# Patient Record
Sex: MALE | Race: WHITE | NOT HISPANIC OR LATINO | Employment: OTHER | URBAN - METROPOLITAN AREA
[De-identification: names, ages, dates, MRNs, and addresses within clinical notes are randomized per-mention and may not be internally consistent; named-entity substitution may affect disease eponyms.]

---

## 2017-02-10 ENCOUNTER — APPOINTMENT (OUTPATIENT)
Dept: LAB | Facility: CLINIC | Age: 77
End: 2017-02-10
Payer: MEDICARE

## 2017-02-10 ENCOUNTER — TRANSCRIBE ORDERS (OUTPATIENT)
Dept: LAB | Facility: CLINIC | Age: 77
End: 2017-02-10

## 2017-02-10 DIAGNOSIS — E78.5 HYPERLIPIDEMIA: ICD-10-CM

## 2017-02-10 LAB
ALT SERPL W P-5'-P-CCNC: 28 U/L (ref 12–78)
AST SERPL W P-5'-P-CCNC: 20 U/L (ref 5–45)
CHOLEST SERPL-MCNC: 239 MG/DL (ref 50–200)
HDLC SERPL-MCNC: 86 MG/DL (ref 40–60)
LDLC SERPL CALC-MCNC: 130 MG/DL (ref 0–100)
TRIGL SERPL-MCNC: 115 MG/DL

## 2017-02-10 PROCEDURE — 36415 COLL VENOUS BLD VENIPUNCTURE: CPT

## 2017-02-10 PROCEDURE — 84450 TRANSFERASE (AST) (SGOT): CPT

## 2017-02-10 PROCEDURE — 84460 ALANINE AMINO (ALT) (SGPT): CPT

## 2017-02-10 PROCEDURE — 80061 LIPID PANEL: CPT

## 2017-02-21 ENCOUNTER — ALLSCRIPTS OFFICE VISIT (OUTPATIENT)
Dept: OTHER | Facility: OTHER | Age: 77
End: 2017-02-21

## 2017-03-07 ENCOUNTER — GENERIC CONVERSION - ENCOUNTER (OUTPATIENT)
Dept: OTHER | Facility: OTHER | Age: 77
End: 2017-03-07

## 2017-04-04 ENCOUNTER — ALLSCRIPTS OFFICE VISIT (OUTPATIENT)
Dept: OTHER | Facility: OTHER | Age: 77
End: 2017-04-04

## 2017-04-04 DIAGNOSIS — E87.1 HYPO-OSMOLALITY AND HYPONATREMIA: ICD-10-CM

## 2017-04-18 ENCOUNTER — TRANSCRIBE ORDERS (OUTPATIENT)
Dept: LAB | Facility: CLINIC | Age: 77
End: 2017-04-18

## 2017-04-18 ENCOUNTER — APPOINTMENT (OUTPATIENT)
Dept: LAB | Facility: CLINIC | Age: 77
End: 2017-04-18
Payer: MEDICARE

## 2017-04-18 ENCOUNTER — ALLSCRIPTS OFFICE VISIT (OUTPATIENT)
Dept: OTHER | Facility: OTHER | Age: 77
End: 2017-04-18

## 2017-04-18 DIAGNOSIS — E87.1 HYPO-OSMOLALITY AND HYPONATREMIA: ICD-10-CM

## 2017-04-18 LAB
ANION GAP SERPL CALCULATED.3IONS-SCNC: 9 MMOL/L (ref 4–13)
BUN SERPL-MCNC: 18 MG/DL (ref 5–25)
CALCIUM SERPL-MCNC: 9.7 MG/DL (ref 8.3–10.1)
CHLORIDE SERPL-SCNC: 97 MMOL/L (ref 100–108)
CO2 SERPL-SCNC: 29 MMOL/L (ref 21–32)
CREAT SERPL-MCNC: 1.14 MG/DL (ref 0.6–1.3)
GFR SERPL CREATININE-BSD FRML MDRD: >60 ML/MIN/1.73SQ M
GLUCOSE P FAST SERPL-MCNC: 90 MG/DL (ref 65–99)
POTASSIUM SERPL-SCNC: 3.9 MMOL/L (ref 3.5–5.3)
SODIUM SERPL-SCNC: 135 MMOL/L (ref 136–145)

## 2017-04-18 PROCEDURE — 80048 BASIC METABOLIC PNL TOTAL CA: CPT

## 2017-04-18 PROCEDURE — 36415 COLL VENOUS BLD VENIPUNCTURE: CPT

## 2017-05-09 ENCOUNTER — GENERIC CONVERSION - ENCOUNTER (OUTPATIENT)
Dept: OTHER | Facility: OTHER | Age: 77
End: 2017-05-09

## 2017-06-21 ENCOUNTER — ALLSCRIPTS OFFICE VISIT (OUTPATIENT)
Dept: OTHER | Facility: OTHER | Age: 77
End: 2017-06-21

## 2017-10-03 ENCOUNTER — ALLSCRIPTS OFFICE VISIT (OUTPATIENT)
Dept: OTHER | Facility: OTHER | Age: 77
End: 2017-10-03

## 2017-10-04 NOTE — PROGRESS NOTES
Assessment  1  Atherosclerotic heart disease of native coronary artery without angina pectoris (414 01)   (I25 10)   2  Hyperlipidemia (272 4) (E78 5)   3  Hypertension (401 9) (I10)   4  Peripheral vascular disease (443 9) (I73 9)   5  GERD without esophagitis (530 81) (K21 9)   6  Osteoarthrosis (715 90) (M19 90)    Plan  Atherosclerotic heart disease of native coronary artery without angina pectoris, Benign  familial tremor, Carotid atherosclerosis, unspecified laterality, GERD without  esophagitis, Hyperlipidemia, Hypertension, Hyponatremia, Osteoarthrosis, Peripheral  vascular disease    · (1) CBC/PLT/DIFF; Status:Active; Requested NJK:69ZBY4824;    · (1) COMPREHENSIVE METABOLIC PANEL; Status:Active; Requested LPP:14CFS1003;    · (1) LIPID PANEL, FASTING; Status:Active; Requested RADHA:00LXD1176;    · (1) T4, FREE; Status:Active; Requested WQV:44LRF9809;    · (1) TSH; Status:Active; Requested ANUPAMA:21FFS2601; Health Maintenance    · Fluzone High-Dose 0 5 ML Intramuscular Suspension Prefilled Syringe    Discussion/Summary    Patricia Marina is a pleasant 75-year-old white male with arteriosclerotic disease who presents today to follow-up on his chronic medical illnesses  ASCVD -blood pressure and lipids are stable  Follow up with Cardiology as indicated  Hypertension -his blood pressure is improved  Continue amlodipine, atenolol, enalapril and HCTZ  Hyperlipidemia -stable  Continue atorvastatin  Peripheral vascular -continue aspirin  Follow up with vascular surgery  GERD -he remains on omeprazole every other day  Osteoarthritis -he is trying to avoid meloxicam  Tylenol as needed  and follow-up 4 months  vaccine today  Chief Complaint  pt is here for follow up of hyperlipidemia, hypertension and to review blood work   cd      History of Present Illness  Patient presents to review his chronic medical conditions  - trying not to use it because it affects his BP  takes about one tylenol per day  continues to suffer from claudication and has pain with walking less than a block  He sees Vascular annually  Review of Systems    Constitutional: as noted in HPI  Eyes: No complaints of eye pain, no red eyes, no discharge from eyes, no itchy eyes  ENT: no complaints of earache, no hearing loss, no nosebleeds, no nasal discharge, no sore throat, no hoarseness  Cardiovascular: as noted in HPI  Respiratory: No complaints of shortness of breath, no wheezing, no cough, no SOB on exertion, no orthopnea or PND  Gastrointestinal: No complaints of abdominal pain, no constipation, no nausea or vomiting, no diarrhea or bloody stools  Genitourinary: No complaints of dysuria, no incontinence, no hesitancy, no nocturia, no genital lesion, no testicular pain  Musculoskeletal: No complaints of arthralgia, no myalgias, no joint swelling or stiffness, no limb pain or swelling  Integumentary: No complaints of skin rash or skin lesions, no itching, no skin wound, no dry skin  Neurological: No compliants of headache, no confusion, no convulsions, no numbness or tingling, no dizziness or fainting, no limb weakness, no difficulty walking  Psychiatric: Is not suicidal, no sleep disturbances, no anxiety or depression, no change in personality, no emotional problems  Endocrine: No complaints of proptosis, no hot flashes, no muscle weakness, no erectile dysfunction, no deepening of the voice, no feelings of weakness  Hematologic/Lymphatic: No complaints of swollen glands, no swollen glands in the neck, does not bleed easily, no easy bruising  Active Problems  1  Angina pectoris (413 9) (I20 9)   2  Atherosclerotic heart disease of native coronary artery without angina pectoris (414 01)   (I25 10)   3  Benign familial tremor (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 1) (G25 0)   4  Carotid atherosclerosis, unspecified laterality (433 10) (I65 29)   5  GERD without esophagitis (530 81) (K21 9)   6  Hyperlipidemia (272 4) (E78 5)   7  Hypertension (401 9) (I10)   8  Hyponatremia (276 1) (E87 1)   9  Osteoarthrosis (715 90) (M19 90)   10  Peripheral vascular disease (443 9) (I73 9)    Past Medical History  1  History of Visit for eye and vision exam (V72 0) (Z01 00)    Surgical History  1  History of CABG    The surgical history was reviewed and updated today  Family History  Mother    1  Family history of Hypertension (V17 49)  Son    2  Family history of Atherosclerosis  Family History    3  No family history of mental disorder    The family history was reviewed and updated today  Social History   · Being A Social Drinker   · Former smoker (A71 11) (F70 467)   · No secondhand smoke exposure (V49 89) (Z78 9)   · Retired   ·   The social history was reviewed and updated today  The social history was reviewed and is unchanged  Current Meds   1  AmLODIPine Besylate 10 MG Oral Tablet; take 1 tablet by mouth once daily; Therapy: 95HBE8333 to (Evaluate:83Mnk7605)  Requested for: 28XQC5310; Last   Rx:08Bci9417 Ordered   2  Aspirin 81 MG TABS; 2 tabs daily; Therapy: (Recorded:10Oct2016) to Recorded   3  Atenolol 25 MG Oral Tablet; take 1/2 tablet by mouth once daily; Therapy: 79XXQ1904 to (Latricia Sousa)  Requested for: 76LLI9294; Last   IQ:21QJL2497 Ordered   4  Atorvastatin Calcium 10 MG Oral Tablet; TAKE 1 TABLET DAILY AS DIRECTED; Therapy: 75OEQ4194 to ([phone])  Requested for: 36JPZ9592; Last   Rx:10Oct2016 Ordered   5  Enalapril Maleate 20 MG Oral Tablet; take 1 tablet by mouth twice a day as directed; Therapy: 95XHD8760 to (Evaluate:28Jun2018)  Requested for: 04NQL6620; Last   Rx:23Hxm7310 Ordered   6  HydroCHLOROthiazide 25 MG Oral Tablet; TAKE 1/2 TABLET BY MOUTH DAILY; Therapy: 21INK8765 to (Allayne Boast)  Requested for: 50RKH5450 Recorded   7  Magnesium Citrate Powder; Therapy: 45HEM2223 to Recorded   8  Meloxicam 15 MG Oral Tablet; take 1 tablet by mouth once daily as directed;    Therapy: 72BOY8458 to (FOGKYYNF:17FJE1064)  Requested for: 06Cgz4609; Last   Rx:77Jxl9034 Ordered   9  Nitrostat 0 4 MG Sublingual Tablet Sublingual;   Therapy: 94BLG0356 to (Last Rx:96Stp8966)  Requested for: 43RIB0625 Ordered   10  Omeprazole 20 MG Oral Capsule Delayed Release; take 1 capsule by mouth once daily; Therapy: 16DAY3355 to (Evaluate:67Eka8843)  Requested for: 42Ixa3197; Last    Rx:74Pxf9105 Ordered    The medication list was reviewed and updated today  Allergies  1  Doxycycline Hyclate TABS    Vitals  Vital Signs    Recorded: 39MEI1761 11:11AM   Heart Rate 64, L Radial   Pulse Quality Regular, L Radial   Systolic 744, LUE, Sitting   Diastolic 78, LUE, Sitting   Height 5 ft 2 in   Weight 146 lb    BMI Calculated 26 7   BSA Calculated 1 67     Physical Exam    Constitutional   General appearance: No acute distress, well appearing and well nourished  Eyes   Conjunctiva and lids: No swelling, erythema, or discharge  Pupils and irises: Equal, round and reactive to light  Pulmonary   Respiratory effort: No increased work of breathing or signs of respiratory distress  Auscultation of lungs: Clear to auscultation, equal breath sounds bilaterally, no wheezes, no rales, no rhonci  Cardiovascular   Auscultation of heart: Normal rate and rhythm, normal S1 and S2, without murmurs  Examination of extremities for edema and/or varicosities: Normal     Carotid pulses: Normal     Lymphatic   Palpation of lymph nodes in neck: No lymphadenopathy  Musculoskeletal   Gait and station: Normal     Digits and nails: Normal without clubbing or cyanosis  Psychiatric   Orientation to person, place and time: Normal     Mood and affect: Normal          Results/Data  PHQ-2 Adult Depression Screening 03TIZ9791 11:40AM User, [facility]     Test Name Result Flag Reference   PHQ-2 Adult Depression Score 1     Over the last two weeks, how often have you been bothered by any of the following problems?   Little interest or pleasure in doing things: Several days - 1  Feeling down, depressed, or hopeless: Not at all - 0   PHQ-2 Adult Depression Screening Negative       PHQ-9 Adult Depression Screening 12DOO6958 11:40AM User, [facility]     Test Name Result Flag Reference   PHQ-9 Adult Depression Score 1     Over the last two weeks, how often have you been bothered by any of the following problems? Little interest or pleasure in doing things: Several days - 1  Feeling down, depressed, or hopeless: Not at all - 0  Trouble falling or staying asleep, or sleeping too much: Not at all - 0  Feeling tired or having little energy: Not at all - 0  Poor appetite or over eating: Not at all - 0  Feeling bad about yourself - or that you are a failure or have let yourself or your family down: Not at all - 0  Trouble concentrating on things, such as reading the newspaper or watching television: Not at all - 0  Moving or speaking so slowly that other people could have noticed  Or the opposite -  being so fidgety or restless that you have been moving around a lot more than usual: Not at all - 0  Thoughts that you would be better off dead, or of hurting yourself in some way: Not at all - 0   PHQ-9 Adult Depression Screening Negative     PHQ-9 Difficulty Level Not difficult at all     PHQ-9 Severity Minimal Depression       Falls Risk Assessment (Dx Z13 89 Screen for Neurologic Disorder) 03Oct2017 11:39AM User, [employer]     Test Name Result Flag Reference   Falls Risk      No falls in the past year       Future Appointments    Date/Time Provider Specialty Site   02/06/2018 11:30 JOSE Graves   [facility] [address]     Signatures   Electronically signed by : JOSE Gutierrez ; Oct  3 2017  1:04PM EST                       (Author)

## 2018-01-02 DIAGNOSIS — I73.9 PERIPHERAL VASCULAR DISEASE (HCC): ICD-10-CM

## 2018-01-02 DIAGNOSIS — M19.90 OSTEOARTHRITIS: ICD-10-CM

## 2018-01-02 DIAGNOSIS — I65.29 OCCLUSION AND STENOSIS OF UNSPECIFIED CAROTID ARTERY: ICD-10-CM

## 2018-01-02 DIAGNOSIS — E78.5 HYPERLIPIDEMIA: ICD-10-CM

## 2018-01-02 DIAGNOSIS — E87.1 HYPO-OSMOLALITY AND HYPONATREMIA (CODE): ICD-10-CM

## 2018-01-02 DIAGNOSIS — K21.9 GASTRO-ESOPHAGEAL REFLUX DISEASE WITHOUT ESOPHAGITIS: ICD-10-CM

## 2018-01-02 DIAGNOSIS — I25.10 ATHEROSCLEROTIC HEART DISEASE OF NATIVE CORONARY ARTERY WITHOUT ANGINA PECTORIS: ICD-10-CM

## 2018-01-02 DIAGNOSIS — I10 ESSENTIAL (PRIMARY) HYPERTENSION: ICD-10-CM

## 2018-01-02 DIAGNOSIS — G25.0 ESSENTIAL TREMOR: ICD-10-CM

## 2018-01-11 NOTE — RESULT NOTES
Message   Recorded as Task   Date: 10/13/2016 09:32 AM, Created By: Israel Patel   Task Name: Follow Up   Assigned To: Lucia Gunn   Regarding Patient: Jim Larios, Status: Active   CommentMerrilee Bucy - 13 Oct 2016 9:32 AM     TASK CREATED  Please notify Mr Gasca that his sodium is stable (it is 132) and it will be repeated in 2-3 months with the labwork I ordered at his appt  Thank you,  Светлана Em - 13 Oct 2016 2:14 PM     TASK EDITED                 [facility] notifying  pt of sodium level results       Signatures   Electronically signed by : Genesis Razo, ; Oct 13 2016  2:14PM EST                       (Author)

## 2018-01-13 VITALS
HEART RATE: 64 BPM | DIASTOLIC BLOOD PRESSURE: 70 MMHG | WEIGHT: 151.25 LBS | SYSTOLIC BLOOD PRESSURE: 122 MMHG | BODY MASS INDEX: 27.83 KG/M2 | HEIGHT: 62 IN

## 2018-01-13 VITALS
DIASTOLIC BLOOD PRESSURE: 60 MMHG | HEART RATE: 64 BPM | HEIGHT: 62 IN | SYSTOLIC BLOOD PRESSURE: 154 MMHG | WEIGHT: 150.8 LBS | BODY MASS INDEX: 27.75 KG/M2

## 2018-01-13 VITALS
HEIGHT: 62 IN | HEART RATE: 64 BPM | DIASTOLIC BLOOD PRESSURE: 78 MMHG | SYSTOLIC BLOOD PRESSURE: 142 MMHG | WEIGHT: 146 LBS | BODY MASS INDEX: 26.87 KG/M2

## 2018-01-14 VITALS
WEIGHT: 145 LBS | HEART RATE: 60 BPM | BODY MASS INDEX: 26.52 KG/M2 | DIASTOLIC BLOOD PRESSURE: 80 MMHG | SYSTOLIC BLOOD PRESSURE: 150 MMHG

## 2018-01-15 NOTE — MISCELLANEOUS
Message  Spoke to Mr Verna Menendez today and reviewed his latest BPs ranging from 130-170 / 73-96  He would prefer to return to his prior regimen of Atenolol, Amlodipine, HCTZ and Enalapril  Will do so, and d/c Benicar HCT  He will continue to monitor at home  He finds that his readings are lower when he does not check them as much        Signatures   Electronically signed by : Jacklyn Simmonds, M D ; May  9 2017  2:45PM EST                       (Author)

## 2018-01-15 NOTE — PROGRESS NOTES
Assessment    1  Medicare annual wellness visit, subsequent (V70 0) (Z00 00)   2  Atherosclerotic heart disease of native coronary artery without angina pectoris (414 01)   (I25 10)   3  Hyperlipidemia (272 4) (E78 5)   4  Hypertension (401 9) (I10)   5  Peripheral vascular disease (443 9) (I73 9)   6  GERD without esophagitis (530 81) (K21 9)   7  Osteoarthrosis (715 90) (M19 90)    Plan  Atherosclerotic heart disease of native coronary artery without angina pectoris, Benign  familial tremor, Carotid atherosclerosis, unspecified laterality, GERD without  esophagitis, Hyperlipidemia, Hypertension, Hyponatremia, Osteoarthrosis, Peripheral  vascular disease    · (1) CBC/PLT/DIFF; Status:Active; Requested WUJ:84SRG7178;    · (1) COMPREHENSIVE METABOLIC PANEL; Status:Active; Requested OLVERA:41LNC8183;    · (1) LIPID PANEL, FASTING; Status:Active; Requested BLACK:45JHH9401;    · (1) T4, FREE; Status:Active; Requested XWK:61JMZ1286;    · (1) TSH; Status:Active; Requested NTW:51RGR4400; Health Maintenance    · Fluzone High-Dose 0 5 ML Intramuscular Suspension Prefilled Syringe    Discussion/Summary    Patient presents for his subsequent annual Medicare questionnaire  His living will and advanced directives are complete  Immunizations are up-to-date  His depression screen is negative  For full evaluation of his chronic medical conditions, please see today's note  Impression: Subsequent Annual Wellness Visit  Cardiovascular screening and counseling: the risks and benefits of screening were discussed and screening is current  Diabetes screening and counseling: the risks and benefits of screening were discussed and screening is current  Prostate cancer screening and counseling: the patient declines screening and screening not indicated  Abdominal aortic aneurysm screening and counseling: the risks and benefits of screening were discussed  Glaucoma screening and counseling: screening is current     HIV screening and counseling: screening not indicated  Immunizations: influenza vaccine is up to date this year, the lifetime pneumococcal vaccine has been completed and Zostavax vaccination up to date  Advance Directive Planning: complete and up to date  Chief Complaint  pt is here for subsequent medicare wellness visit   cd      History of Present Illness  The patient is being seen for the subsequent annual wellness visit  Medicare Screening and Risk Factors   Hospitalizations: no previous hospitalizations  Once per lifetime medicare screening tests: ECG has not been done  Medicare Screening Tests Risk Questions   Abdominal aortic aneurysm risk assessment: over 72years of age  Osteoporosis risk assessment: , over 48years of age, tobacco use and alcohol use  HIV risk assessment: none indicated  Drug and Alcohol Use: The patient is a former cigarette smoker  The patient reports frequent alcohol use and drinking 2 drinks per day  Alcohol concern:   The patient has no concerns about alcohol abuse  He has never used illicit drugs  Diet and Physical Activity: Current diet includes well balanced meals, 1 servings of fruit per day, 2 servings of vegetables per day, 2 servings of meat per day, 1 servings of whole grains per day, 2 servings of simple carbohydrates per day, 1 servings of dairy products per day, 5 cups of coffee per day, 1 cans of diet soda per day and 3 glasses of water daily  He exercises 5 times per week  Exercise: walking 30 minutes per day  Mood Disorder and Cognitive Impairment Screening: PHQ-9 Depression Scale   Depression screening score was 0     negative for symptoms  He reports feeling down, depressed, or hopeless over the past two weeks  He denies feeling little interest or pleasure in doing things over the past two weeks     Cognitive impairment screening: denies difficulty learning/retaining new information, denies difficulty handling complex tasks, denies difficulty with reasoning, denies difficulty with spatial ability and orientation, denies difficulty with language and denies difficulty with behavior  Functional Ability/Level of Safety: Hearing is normal bilaterally, normal in the right ear and normal in the left ear  He denies hearing difficulties  He does not use a hearing aid  The patient is currently able to do activities of daily living without limitations, able to do instrumental activities of daily living without limitations, able to participate in social activities without limitations and able to drive without limitations  Activities of daily living details: does not need help using the phone, no transportation help needed, does not need help shopping, no meal preparation help needed, does not need help doing housework, does not need help doing laundry, does not need help managing medications and does not need help managing money  Fall risk factors: The patient fell 0 times in the past 12 months  Home safety risk factors:  no unfamiliar surroundings, no loose rugs, no poor household lighting, no uneven floors, no household clutter, grab bars in the bathroom and handrails on the stairs  Advance Directives: Advance directives: living will, durable power of  for health care directives and advance directives  end of life decisions were reviewed with the patient and I agree with the patient's decisions  Co-Managers and Medical Equipment/Suppliers: See Patient Care Team   Falls Risk: The patient fell 0 times in the past 12 months  The patient currently has no urinary incontinence symptoms  Active Problems    1  Angina pectoris (413 9) (I20 9)   2  Atherosclerotic heart disease of native coronary artery without angina pectoris (414 01)   (I25 10)   3  Benign familial tremor (333 1) (G25 0)   4  Carotid atherosclerosis, unspecified laterality (433 10) (I65 29)   5  GERD without esophagitis (530 81) (K21 9)   6  Hyperlipidemia (272 4) (E78 5)   7  Hypertension (401 9) (I10)   8  Hyponatremia (276 1) (E87 1)   9  Osteoarthrosis (715 90) (M19 90)   10  Peripheral vascular disease (443 9) (I73 9)    Past Medical History    1  History of Visit for eye and vision exam (V72 0) (Z01 00)    Surgical History    1  History of CABG    Family History  Mother    1  Family history of Hypertension (V17 49)  Son    2  Family history of Atherosclerosis  Family History    3  No family history of mental disorder    Social History    · Being A Social Drinker   · Former smoker (C20 80) (I52 209)   · No secondhand smoke exposure (V49 89) (Z78 9)   · Retired   ·     Current Meds   1  AmLODIPine Besylate 10 MG Oral Tablet; take 1 tablet by mouth once daily; Therapy: 04SRP5856 to (Evaluate:02Vwm5377)  Requested for: 72KKU7119; Last   Rx:93Mak5359 Ordered   2  Aspirin 81 MG TABS; 2 tabs daily; Therapy: (Recorded:10Oct2016) to Recorded   3  Atenolol 25 MG Oral Tablet; take 1/2 tablet by mouth once daily; Therapy: 39URJ6248 to (Wang Cerda)  Requested for: 68DHL2452; Last   VM:63DHI8462 Ordered   4  Atorvastatin Calcium 10 MG Oral Tablet; TAKE 1 TABLET DAILY AS DIRECTED; Therapy: 46LLY0982 to ([phone])  Requested for: 22KYS7100; Last   Rx:10Oct2016 Ordered   5  Enalapril Maleate 20 MG Oral Tablet; take 1 tablet by mouth twice a day as directed; Therapy: 50WYU9975 to (Evaluate:28Jun2018)  Requested for: 96GYF3459; Last   Rx:53Ahf9294 Ordered   6  HydroCHLOROthiazide 25 MG Oral Tablet; TAKE 1/2 TABLET BY MOUTH DAILY; Therapy: 01HAL3056 to (Kirk Coronado)  Requested for: 39EBW3203 Recorded   7  Magnesium Citrate Powder; Therapy: 29OAF3797 to Recorded   8  Meloxicam 15 MG Oral Tablet; take 1 tablet by mouth once daily as directed; Therapy: 07HIE5290 to (72 240 26 09)  Requested for: 12Aec0164; Last   Rx:26Jko6166 Ordered   9   Nitrostat 0 4 MG Sublingual Tablet Sublingual;   Therapy: 62UOY8977 to (Last Rx:08Odq9506)  Requested for: 75THF3296 Ordered   10  Omeprazole 20 MG Oral Capsule Delayed Release; take 1 capsule by mouth once daily; Therapy: 39QCF9429 to (Evaluate:27Tpc1909)  Requested for: 34Xka7708; Last    Rx:82Jbz0599 Ordered    Allergies    1  Doxycycline Hyclate TABS    Immunizations  Influenza --- Charlynne Loupe: 21-Oct-2012  (72y); Series2: 23-Aug-2013  (73y); Series3: 13-Oct-2015   (75y); Series4: 10-Oct-2016  (76y); Series5: [id number]   PPSV --- Charlynne Loupe: [id number]   Zoster --- Charlynne Loupe: [id number]     Vitals  Signs   Recorded: 03EYV0799 11:11AM   Heart Rate: 64, L Radial  Pulse Quality: Regular, L Radial  Systolic: 854, LUE, Sitting  Diastolic: 78, LUE, Sitting  Height: 5 ft 2 in  Weight: 146 lb   BMI Calculated: 26 7  BSA Calculated: 1 67    Results/Data  PHQ-2 Adult Depression Screening 93KAG4066 11:40AM User, [employer]     Test Name Result Flag Reference   PHQ-2 Adult Depression Score 1     Over the last two weeks, how often have you been bothered by any of the following problems? Little interest or pleasure in doing things: Several days - 1  Feeling down, depressed, or hopeless: Not at all - 0   PHQ-2 Adult Depression Screening Negative       PHQ-9 Adult Depression Screening 12RSO7200 11:40AM User, [employer]     Test Name Result Flag Reference   PHQ-9 Adult Depression Score 1     Over the last two weeks, how often have you been bothered by any of the following problems? Little interest or pleasure in doing things: Several days - 1  Feeling down, depressed, or hopeless: Not at all - 0  Trouble falling or staying asleep, or sleeping too much: Not at all - 0  Feeling tired or having little energy: Not at all - 0  Poor appetite or over eating: Not at all - 0  Feeling bad about yourself - or that you are a failure or have let yourself or your family down: Not at all - 0  Trouble concentrating on things, such as reading the newspaper or watching television: Not at all - 0  Moving or speaking so slowly that other people could have noticed   Or the opposite -  being so fidgety or restless that you have been moving around a lot more than usual: Not at all - 0  Thoughts that you would be better off dead, or of hurting yourself in some way: Not at all - 0   PHQ-9 Adult Depression Screening Negative     PHQ-9 Difficulty Level Not difficult at all     PHQ-9 Severity Minimal Depression       Falls Risk Assessment (Dx Z13 89 Screen for Neurologic Disorder) 03Oct2017 11:39AM User, [facility]     Test Name Result Flag Reference   Falls Risk      No falls in the past year       Future Appointments    Date/Time Provider Specialty Site   02/06/2018 11:30 Marvis Bence, M D   [facility] [address]     Signatures   Electronically signed by : JOSE Sharp ; Oct  3 2017  1:08PM EST                       (Author)

## 2018-01-17 NOTE — PROGRESS NOTES
Chief Complaint  pt was in for a b/p check as ordered by dr Tim Clarke   pts b/p was taken on left arm with a reading of 160/78 and right arm with a reading of 140/64   i brought this to dr Charmaine Peralta and her recommendation was for the patient to continue checking b/p at home and to call if it goes over 150 consistently for 3 days   this was relayed to the patient and he agreed   cd      Active Problems    1  Angina pectoris (413 9) (I20 9)   2  Atherosclerotic heart disease of native coronary artery without angina pectoris (414 01)   (I25 10)   3  Benign familial tremor (333 1) (G25 0)   4  Carotid atherosclerosis, unspecified laterality (433 10) (I65 29)   5  Eustachian tube dysfunction (381 81) (H69 80)   6  GERD without esophagitis (530 81) (K21 9)   7  Hyperlipidemia (272 4) (E78 5)   8  Hypertension (401 9) (I10)   9  Hyponatremia (276 1) (E87 1)   10  Osteoarthritis (715 90) (M19 90)   11  Peripheral vascular disease (443 9) (I73 9)    Current Meds   1  AmLODIPine Besylate 10 MG Oral Tablet; take 1 tablet by mouth once daily; Therapy: 84DYG8587 to (Evaluate:68Pvk0555)  Requested for: 47VWL6761; Last   Rx:91Pqb7573 Ordered   2  Aspirin 81 MG TABS; 2 tabs daily; Therapy: (Recorded:10Oct2016) to Recorded   3  Atenolol 25 MG Oral Tablet; take 1/2 tablet by mouth once daily; Therapy: 33SMA3313 to (Evaluate:71Ukg2008)  Requested for: 67NWV3106; Last   Rx:35Npj9875 Ordered   4  Atorvastatin Calcium 10 MG Oral Tablet; TAKE 1 TABLET DAILY AS DIRECTED; Therapy: 22CGS8585 to (Didi Benavides)  Requested for: 12MPW4496; Last   Rx:10Oct2016 Ordered   5  Benicar HCT 40-25 MG Oral Tablet; TAKE 1 TABLET DAILY; Therapy: 17HDD5829 to Recorded   6  Magnesium Citrate Powder; Therapy: 87UGX5790 to Recorded   7  Meloxicam 15 MG Oral Tablet; take 1 tablet by mouth once daily as directed; Therapy: 81GKQ2864 to (Evaluate:87Ztc1721)  Requested for: 42Ise7425; Last   Rx:88Krq7212 Ordered   8   Nitrostat 0 4 MG Sublingual Tablet Sublingual;   Therapy: 98EOV6004 to (Last Rx:22Ohu8326)  Requested for: 22BAH1940 Ordered   9  Omeprazole 20 MG Oral Capsule Delayed Release; take 1 capsule by mouth once daily; Therapy: 24JRJ2895 to (Evaluate:99Uud2889)  Requested for: 82Sfz0962; Last   Rx:41Dnw1235 Ordered    Allergies    1  Doxycycline Hyclate TABS    Vitals  Signs    Heart Rate: 64  Height: 5 ft 2 in  Weight: 151 lb 6 4 oz  BMI Calculated: 27 69  BSA Calculated: 1 7    Future Appointments    Date/Time Provider Specialty Site   06/21/2017 09:15 JOSE Segura   [address] [place] [address]     Signatures   Electronically signed by : Sonal Rodriguez, ; Apr 18 2017  2:25PM EST                       (Author)    Electronically signed by : JOSE Holcomb ; Apr 18 2017  9:50PM EST                       (Author)

## 2018-01-18 ENCOUNTER — TRANSCRIBE ORDERS (OUTPATIENT)
Dept: LAB | Facility: CLINIC | Age: 78
End: 2018-01-18

## 2018-01-18 ENCOUNTER — APPOINTMENT (OUTPATIENT)
Dept: LAB | Facility: CLINIC | Age: 78
End: 2018-01-18
Payer: MEDICARE

## 2018-01-18 DIAGNOSIS — E78.5 HYPERLIPIDEMIA: ICD-10-CM

## 2018-01-18 DIAGNOSIS — I65.29 OCCLUSION AND STENOSIS OF UNSPECIFIED CAROTID ARTERY: ICD-10-CM

## 2018-01-18 DIAGNOSIS — I10 ESSENTIAL (PRIMARY) HYPERTENSION: ICD-10-CM

## 2018-01-18 DIAGNOSIS — M19.90 OSTEOARTHRITIS: ICD-10-CM

## 2018-01-18 DIAGNOSIS — I25.10 ATHEROSCLEROTIC HEART DISEASE OF NATIVE CORONARY ARTERY WITHOUT ANGINA PECTORIS: ICD-10-CM

## 2018-01-18 DIAGNOSIS — K21.9 GASTRO-ESOPHAGEAL REFLUX DISEASE WITHOUT ESOPHAGITIS: ICD-10-CM

## 2018-01-18 DIAGNOSIS — I73.9 PERIPHERAL VASCULAR DISEASE (HCC): ICD-10-CM

## 2018-01-18 DIAGNOSIS — E87.1 HYPO-OSMOLALITY AND HYPONATREMIA (CODE): ICD-10-CM

## 2018-01-18 DIAGNOSIS — G25.0 ESSENTIAL TREMOR: ICD-10-CM

## 2018-01-18 LAB
ALBUMIN SERPL BCP-MCNC: 3.9 G/DL (ref 3.5–5)
ALP SERPL-CCNC: 77 U/L (ref 46–116)
ALT SERPL W P-5'-P-CCNC: 22 U/L (ref 12–78)
ANION GAP SERPL CALCULATED.3IONS-SCNC: 6 MMOL/L (ref 4–13)
AST SERPL W P-5'-P-CCNC: 22 U/L (ref 5–45)
BASOPHILS # BLD AUTO: 0.07 THOUSANDS/ΜL (ref 0–0.1)
BASOPHILS NFR BLD AUTO: 1 % (ref 0–1)
BILIRUB SERPL-MCNC: 0.69 MG/DL (ref 0.2–1)
BUN SERPL-MCNC: 18 MG/DL (ref 5–25)
CALCIUM SERPL-MCNC: 9 MG/DL (ref 8.3–10.1)
CHLORIDE SERPL-SCNC: 96 MMOL/L (ref 100–108)
CHOLEST SERPL-MCNC: 222 MG/DL (ref 50–200)
CO2 SERPL-SCNC: 29 MMOL/L (ref 21–32)
CREAT SERPL-MCNC: 1.17 MG/DL (ref 0.6–1.3)
EOSINOPHIL # BLD AUTO: 0.26 THOUSAND/ΜL (ref 0–0.61)
EOSINOPHIL NFR BLD AUTO: 4 % (ref 0–6)
ERYTHROCYTE [DISTWIDTH] IN BLOOD BY AUTOMATED COUNT: 13.5 % (ref 11.6–15.1)
GFR SERPL CREATININE-BSD FRML MDRD: 59 ML/MIN/1.73SQ M
GLUCOSE P FAST SERPL-MCNC: 90 MG/DL (ref 65–99)
HCT VFR BLD AUTO: 38.3 % (ref 36.5–49.3)
HDLC SERPL-MCNC: 77 MG/DL (ref 40–60)
HGB BLD-MCNC: 13.4 G/DL (ref 12–17)
LDLC SERPL CALC-MCNC: 119 MG/DL (ref 0–100)
LYMPHOCYTES # BLD AUTO: 2.67 THOUSANDS/ΜL (ref 0.6–4.47)
LYMPHOCYTES NFR BLD AUTO: 42 % (ref 14–44)
MCH RBC QN AUTO: 29.1 PG (ref 26.8–34.3)
MCHC RBC AUTO-ENTMCNC: 35 G/DL (ref 31.4–37.4)
MCV RBC AUTO: 83 FL (ref 82–98)
MONOCYTES # BLD AUTO: 0.62 THOUSAND/ΜL (ref 0.17–1.22)
MONOCYTES NFR BLD AUTO: 10 % (ref 4–12)
NEUTROPHILS # BLD AUTO: 2.71 THOUSANDS/ΜL (ref 1.85–7.62)
NEUTS SEG NFR BLD AUTO: 43 % (ref 43–75)
NRBC BLD AUTO-RTO: 0 /100 WBCS
PLATELET # BLD AUTO: 329 THOUSANDS/UL (ref 149–390)
PMV BLD AUTO: 9.4 FL (ref 8.9–12.7)
POTASSIUM SERPL-SCNC: 4 MMOL/L (ref 3.5–5.3)
PROT SERPL-MCNC: 7.4 G/DL (ref 6.4–8.2)
RBC # BLD AUTO: 4.6 MILLION/UL (ref 3.88–5.62)
SODIUM SERPL-SCNC: 131 MMOL/L (ref 136–145)
T4 FREE SERPL-MCNC: 1.08 NG/DL (ref 0.76–1.46)
TRIGL SERPL-MCNC: 128 MG/DL
TSH SERPL DL<=0.05 MIU/L-ACNC: 1.85 UIU/ML (ref 0.36–3.74)
WBC # BLD AUTO: 6.34 THOUSAND/UL (ref 4.31–10.16)

## 2018-01-18 PROCEDURE — 84439 ASSAY OF FREE THYROXINE: CPT

## 2018-01-18 PROCEDURE — 84443 ASSAY THYROID STIM HORMONE: CPT

## 2018-01-18 PROCEDURE — 80061 LIPID PANEL: CPT

## 2018-01-18 PROCEDURE — 36415 COLL VENOUS BLD VENIPUNCTURE: CPT

## 2018-01-18 PROCEDURE — 85025 COMPLETE CBC W/AUTO DIFF WBC: CPT

## 2018-01-18 PROCEDURE — 80053 COMPREHEN METABOLIC PANEL: CPT

## 2018-01-22 VITALS — BODY MASS INDEX: 27.86 KG/M2 | HEART RATE: 64 BPM | HEIGHT: 62 IN | WEIGHT: 151.4 LBS

## 2018-02-03 RX ORDER — HYDROCHLOROTHIAZIDE 25 MG/1
0.5 TABLET ORAL DAILY
COMMUNITY
Start: 2011-06-09 | End: 2018-02-04 | Stop reason: SDUPTHER

## 2018-02-03 RX ORDER — NITROGLYCERIN 0.4 MG/1
TABLET SUBLINGUAL
COMMUNITY
Start: 2011-07-14

## 2018-02-03 RX ORDER — ASPIRIN 81 MG/1
2 TABLET ORAL DAILY
COMMUNITY
End: 2018-10-23 | Stop reason: CLARIF

## 2018-02-03 RX ORDER — MAGNESIUM CITRATE
POWDER (GRAM) MISCELLANEOUS
COMMUNITY
Start: 2015-03-20 | End: 2018-10-03

## 2018-02-03 RX ORDER — MELOXICAM 15 MG/1
1 TABLET ORAL DAILY
COMMUNITY
Start: 2010-09-16 | End: 2018-02-13 | Stop reason: SDUPTHER

## 2018-02-03 RX ORDER — ATENOLOL 25 MG/1
0.5 TABLET ORAL DAILY
COMMUNITY
Start: 2010-09-16 | End: 2018-05-06 | Stop reason: SDUPTHER

## 2018-02-03 RX ORDER — ENALAPRIL MALEATE 20 MG/1
1 TABLET ORAL 2 TIMES DAILY
COMMUNITY
Start: 2012-01-03 | End: 2018-07-08 | Stop reason: SDUPTHER

## 2018-02-03 RX ORDER — OMEPRAZOLE 20 MG/1
1 CAPSULE, DELAYED RELEASE ORAL DAILY
COMMUNITY
Start: 2013-10-05 | End: 2018-02-15 | Stop reason: SDUPTHER

## 2018-02-03 RX ORDER — AMLODIPINE BESYLATE 10 MG/1
1 TABLET ORAL DAILY
COMMUNITY
Start: 2011-09-10 | End: 2018-07-15 | Stop reason: SDUPTHER

## 2018-02-03 RX ORDER — ATORVASTATIN CALCIUM 10 MG/1
1 TABLET, FILM COATED ORAL DAILY
COMMUNITY
Start: 2016-10-10 | End: 2018-10-21 | Stop reason: SDUPTHER

## 2018-02-04 DIAGNOSIS — I10 ESSENTIAL HYPERTENSION: Primary | ICD-10-CM

## 2018-02-04 RX ORDER — HYDROCHLOROTHIAZIDE 25 MG/1
TABLET ORAL
Qty: 90 TABLET | Refills: 1 | Status: SHIPPED | OUTPATIENT
Start: 2018-02-04 | End: 2019-01-27 | Stop reason: SDUPTHER

## 2018-02-06 ENCOUNTER — OFFICE VISIT (OUTPATIENT)
Dept: FAMILY MEDICINE CLINIC | Facility: CLINIC | Age: 78
End: 2018-02-06
Payer: MEDICARE

## 2018-02-06 VITALS
HEIGHT: 62 IN | HEART RATE: 64 BPM | SYSTOLIC BLOOD PRESSURE: 140 MMHG | DIASTOLIC BLOOD PRESSURE: 62 MMHG | BODY MASS INDEX: 27.42 KG/M2 | WEIGHT: 149 LBS

## 2018-02-06 DIAGNOSIS — I77.9 BILATERAL CAROTID ARTERY DISEASE (HCC): ICD-10-CM

## 2018-02-06 DIAGNOSIS — I73.9 PERIPHERAL VASCULAR DISEASE (HCC): ICD-10-CM

## 2018-02-06 DIAGNOSIS — E78.00 PURE HYPERCHOLESTEROLEMIA: Primary | ICD-10-CM

## 2018-02-06 DIAGNOSIS — E87.1 HYPONATREMIA: ICD-10-CM

## 2018-02-06 DIAGNOSIS — G25.0 BENIGN FAMILIAL TREMOR: ICD-10-CM

## 2018-02-06 DIAGNOSIS — K21.9 GERD WITHOUT ESOPHAGITIS: ICD-10-CM

## 2018-02-06 DIAGNOSIS — M19.90 OSTEOARTHRITIS, UNSPECIFIED OSTEOARTHRITIS TYPE, UNSPECIFIED SITE: ICD-10-CM

## 2018-02-06 PROCEDURE — 99214 OFFICE O/P EST MOD 30 MIN: CPT | Performed by: FAMILY MEDICINE

## 2018-02-06 RX ORDER — ROSUVASTATIN CALCIUM 10 MG/1
5 TABLET, COATED ORAL
COMMUNITY
End: 2018-02-06 | Stop reason: ALTCHOICE

## 2018-02-06 NOTE — PROGRESS NOTES
Assessment/Plan:    Mr Sonya Minaya is a pleasant 57-year-old, ex , who presents today to follow-up on his chronic medical conditions and to review blood work  He has a long history of vascular disease and is followed by vascular surgeons at [facility]  He does not want to make any changes to his current medication regimen  At the current time, he is feeling well and has no complaints  1   Hyperlipidemia -he is on Lipitor 10 mg daily and does not want to increase his dosage  LDL is 119    2   Peripheral vascular disease -follow up with vascular  Continue excellent control of blood pressure and lipids  Continue aspirin 81 mg daily  3   Bilateral carotid artery disease -follow up with vascular  Continue excellent control of blood pressure and lipids  Continue aspirin 81 mg daily  4   Familial tremor -no intervention at this time  5   GERD -remain controlled with the use of omeprazole  symptoms  6  Osteoarthritis -  he uses meloxicam as needed  7   Hyponatremia -sodium is 131 and stable  Labs reviewed at length with patient  Follow-up in 4 months  Subjective:      Patient ID: Jero Graham is a 76 y o  male  Chief complaint:  Pt is here for follow up of hypertension and hyperlipidemia and to review blood work   cd        The following portions of the patient's history were reviewed and updated as appropriate: allergies, current medications, past family history, past medical history, past social history, past surgical history and problem list     Review of Systems   Constitutional:        See HPI   HENT: Negative for congestion, ear pain, mouth sores, sinus pressure and trouble swallowing  Eyes: Negative for discharge, redness and itching  Respiratory: Negative for apnea, cough, chest tightness, shortness of breath, wheezing and stridor  Cardiovascular: Negative for chest pain, palpitations and leg swelling          He does have lower extremity claudication after walking 7/10th of a mile  This is chronic  Gastrointestinal: Negative for abdominal distention, abdominal pain, blood in stool, constipation, diarrhea, nausea and vomiting  Endocrine: Negative for cold intolerance and heat intolerance  Genitourinary: Negative for difficulty urinating, dysuria, flank pain and urgency  Musculoskeletal: Negative for arthralgias and myalgias  Skin: Negative for rash  Neurological: Negative for dizziness, seizures, syncope, speech difficulty, weakness, light-headedness, numbness and headaches  Hematological: Negative for adenopathy  Psychiatric/Behavioral: Negative for agitation, behavioral problems, confusion and sleep disturbance  The patient is not nervous/anxious  Objective:  /80 (BP Location: Left arm, Patient Position: Sitting, Cuff Size: Standard)   Pulse 64   Ht 5' 2" (1 575 m)   Wt 67 6 kg (149 lb)   BMI 27 25 kg/m²       Physical Exam   Constitutional: He is oriented to person, place, and time  He appears well-developed and well-nourished  No distress  HENT:   Head: Normocephalic and atraumatic  Right Ear: External ear normal    Left Ear: External ear normal    Eyes: Conjunctivae and EOM are normal  Pupils are equal, round, and reactive to light  No scleral icterus  Neck: Normal range of motion  Neck supple  Right carotid murmur noted  Cardiovascular: Normal rate, regular rhythm, normal heart sounds and intact distal pulses  Exam reveals no gallop and no friction rub  No murmur heard  Pulmonary/Chest: Effort normal  No respiratory distress  He has no wheezes  He has no rales  He exhibits no tenderness  Musculoskeletal: Normal range of motion  He exhibits no edema, tenderness or deformity  Lymphadenopathy:     He has no cervical adenopathy  Neurological: He is alert and oriented to person, place, and time  He has normal reflexes  Skin: Skin is warm and dry  He is not diaphoretic  Psychiatric: He has a normal mood and affect   His behavior is normal  Judgment and thought content normal

## 2018-02-13 DIAGNOSIS — M19.90 OSTEOARTHRITIS, UNSPECIFIED OSTEOARTHRITIS TYPE, UNSPECIFIED SITE: Primary | ICD-10-CM

## 2018-02-13 RX ORDER — MELOXICAM 15 MG/1
TABLET ORAL
Qty: 30 TABLET | Refills: 3 | Status: SHIPPED | OUTPATIENT
Start: 2018-02-13 | End: 2018-06-26 | Stop reason: SDUPTHER

## 2018-02-15 DIAGNOSIS — K21.9 GASTROESOPHAGEAL REFLUX DISEASE, ESOPHAGITIS PRESENCE NOT SPECIFIED: Primary | ICD-10-CM

## 2018-02-15 RX ORDER — OMEPRAZOLE 20 MG/1
20 CAPSULE, DELAYED RELEASE ORAL DAILY
Qty: 90 CAPSULE | Refills: 0 | Status: SHIPPED | OUTPATIENT
Start: 2018-02-15 | End: 2018-07-29 | Stop reason: SDUPTHER

## 2018-05-06 DIAGNOSIS — I10 ESSENTIAL HYPERTENSION: Primary | ICD-10-CM

## 2018-05-06 RX ORDER — ATENOLOL 25 MG/1
TABLET ORAL
Qty: 45 TABLET | Refills: 3 | Status: SHIPPED | OUTPATIENT
Start: 2018-05-06 | End: 2019-04-28 | Stop reason: SDUPTHER

## 2018-06-12 ENCOUNTER — OFFICE VISIT (OUTPATIENT)
Dept: FAMILY MEDICINE CLINIC | Facility: CLINIC | Age: 78
End: 2018-06-12
Payer: MEDICARE

## 2018-06-12 VITALS
DIASTOLIC BLOOD PRESSURE: 90 MMHG | SYSTOLIC BLOOD PRESSURE: 150 MMHG | BODY MASS INDEX: 26.76 KG/M2 | HEART RATE: 60 BPM | HEIGHT: 62 IN | WEIGHT: 145.4 LBS

## 2018-06-12 DIAGNOSIS — I73.9 PERIPHERAL VASCULAR DISEASE (HCC): Primary | ICD-10-CM

## 2018-06-12 DIAGNOSIS — E78.00 PURE HYPERCHOLESTEROLEMIA: ICD-10-CM

## 2018-06-12 DIAGNOSIS — I77.9 BILATERAL CAROTID ARTERY DISEASE (HCC): ICD-10-CM

## 2018-06-12 DIAGNOSIS — K21.9 GERD WITHOUT ESOPHAGITIS: ICD-10-CM

## 2018-06-12 DIAGNOSIS — E87.1 HYPONATREMIA: ICD-10-CM

## 2018-06-12 PROCEDURE — 99214 OFFICE O/P EST MOD 30 MIN: CPT | Performed by: FAMILY MEDICINE

## 2018-06-12 NOTE — PROGRESS NOTES
Assessment/Plan:  Mr Javier Rouse is a pleasant 10-year-old white male who presents today to follow-up on his chronic medical conditions, most of which relate to arteriosclerotic disease  At the present time, he is feeling stable with no additional acute complaints  1   Peripheral vascular disease -he recently saw vascular surgery  No change in current regimen  Continue aspirin 81 mg daily with emphasis on lipid and blood pressure control  He was on Plavix in the past but it was stopped due to intolerance  2   Bilateral carotid artery disease -see above  His carotid status is stable  3   GERD - omeprazole every other day  4  Hyperlipidemia -stable  Continue atorvastatin 10 mg daily  5   Hyponatremia -follow-up BMP  6   HTN -blood pressure is elevated 150/70  He does not want to use additional medication and states that he has white coat syndrome  Continue atenolol 25 mg, enalapril 20 mg, HCTZ and amlodipine 10 mg daily and follow-up in 3 months  Monitor BP at home  7  OA -he continues to use meloxicam for daily pain, although he is aware that this may increase his blood pressure  Tylenol does not help him  Follow-up 3-4 months  Subjective: pt is here for follow up of chronic conditions~cd     Patient ID: Latrell Marie is a [de-identified] y o  male  Patient presents today to follow-up on chronic medical conditions he recently saw vascular surgery without any changes in current regimen  He feels that he is stable and does not want any additional meds        The following portions of the patient's history were reviewed and updated as appropriate: allergies, current medications, past family history, past medical history, past social history, past surgical history and problem list     Review of Systems   Constitutional:        See HPI   HENT: Negative for congestion, ear pain, mouth sores, sinus pressure and trouble swallowing  Eyes: Negative for discharge, redness and itching     Respiratory: Negative for apnea, cough, chest tightness, shortness of breath, wheezing and stridor  Cardiovascular: Negative for chest pain, palpitations and leg swelling  Gastrointestinal: Negative for abdominal distention, abdominal pain, blood in stool, constipation, diarrhea, nausea and vomiting  Endocrine: Negative for cold intolerance and heat intolerance  Genitourinary: Negative for difficulty urinating, dysuria, flank pain and urgency  Musculoskeletal: Negative for arthralgias and myalgias  Skin: Negative for rash  Neurological: Negative for dizziness, seizures, syncope, speech difficulty, weakness, light-headedness, numbness and headaches  Hematological: Negative for adenopathy  Psychiatric/Behavioral: Negative for agitation, behavioral problems, confusion and sleep disturbance  The patient is not nervous/anxious  Objective:  Vitals:    06/12/18 1124   BP: 150/90   BP Location: Left arm   Patient Position: Sitting   Cuff Size: Standard   Pulse: 60   Weight: 66 kg (145 lb 6 4 oz)   Height: 5' 2" (1 575 m)                Physical Exam   Constitutional: He is oriented to person, place, and time  He appears well-developed and well-nourished  No distress  HENT:   Head: Normocephalic and atraumatic  Right Ear: External ear normal    Left Ear: External ear normal    Eyes: Conjunctivae and EOM are normal  Pupils are equal, round, and reactive to light  No scleral icterus  Neck: Normal range of motion  Neck supple  Cardiovascular: Normal rate, regular rhythm, normal heart sounds and intact distal pulses  Exam reveals no gallop and no friction rub  No murmur heard  Pulmonary/Chest: Effort normal  No respiratory distress  He has no wheezes  He has no rales  He exhibits no tenderness  Musculoskeletal: Normal range of motion  He exhibits no edema, tenderness or deformity  Lymphadenopathy:     He has no cervical adenopathy  Neurological: He is alert and oriented to person, place, and time   He has normal reflexes  Skin: Skin is warm and dry  He is not diaphoretic  Psychiatric: He has a normal mood and affect   His behavior is normal  Judgment and thought content normal

## 2018-06-26 DIAGNOSIS — M19.90 OSTEOARTHRITIS, UNSPECIFIED OSTEOARTHRITIS TYPE, UNSPECIFIED SITE: ICD-10-CM

## 2018-06-26 RX ORDER — MELOXICAM 15 MG/1
TABLET ORAL
Qty: 30 TABLET | Refills: 3 | Status: SHIPPED | OUTPATIENT
Start: 2018-06-26 | End: 2018-10-28 | Stop reason: SDUPTHER

## 2018-07-08 DIAGNOSIS — I10 BENIGN ESSENTIAL HYPERTENSION: Primary | ICD-10-CM

## 2018-07-09 RX ORDER — ENALAPRIL MALEATE 20 MG/1
TABLET ORAL
Qty: 180 TABLET | Refills: 3 | Status: SHIPPED | OUTPATIENT
Start: 2018-07-09 | End: 2018-11-19

## 2018-07-15 DIAGNOSIS — I10 ESSENTIAL HYPERTENSION: Primary | ICD-10-CM

## 2018-07-16 RX ORDER — AMLODIPINE BESYLATE 10 MG/1
TABLET ORAL
Qty: 90 TABLET | Refills: 3 | Status: SHIPPED | OUTPATIENT
Start: 2018-07-16 | End: 2019-07-14 | Stop reason: SDUPTHER

## 2018-07-29 DIAGNOSIS — K21.9 GASTROESOPHAGEAL REFLUX DISEASE, ESOPHAGITIS PRESENCE NOT SPECIFIED: ICD-10-CM

## 2018-07-30 RX ORDER — OMEPRAZOLE 20 MG/1
CAPSULE, DELAYED RELEASE ORAL
Qty: 90 CAPSULE | Refills: 0 | Status: SHIPPED | OUTPATIENT
Start: 2018-07-30 | End: 2019-02-03 | Stop reason: SDUPTHER

## 2018-09-27 ENCOUNTER — TELEPHONE (OUTPATIENT)
Dept: FAMILY MEDICINE CLINIC | Facility: CLINIC | Age: 78
End: 2018-09-27

## 2018-09-27 NOTE — TELEPHONE ENCOUNTER
PT STATES THAT HE SEES DR Riddhi George AND THAT HE NEEDS HER ADVICE - HE HAD AN "INCIDENT" ABOUT A WEEK AGO WHICH LEFT HIM WITH COGNITIVE AND EYESIGHT PROBLEMS  I OFFERED TO GIVE HIM AN APPOINTMENT TODAY WITH ANOTHER DOCTOR AND PATIENT REFUSED, STATING THAT HE WANTS ADVICE FROM DR Riddhi George  PLEASE ADVISE  THANK YOU  LIKE I SAID, I TRIED GETTING HIM IN TODAY WITH ANY OF OUR PROVIDERS AND HE WANTS TO TALK TO DR Riddhi George AND SEE WHAT SHE HAS TO SAY

## 2018-09-27 NOTE — TELEPHONE ENCOUNTER
I spoke with patient at length today after receiving a message about his "incident"  Apparently, one week ago he coughed and then felt that he lost some cognitive function  He describes this as not being able to remember how to get into the car, or the garage, how to use the sweeper, or how to work the [employer]  He did not want to go to the ER, and so went for his daily walk  He notes that he had no loss of motor or sensory function  He did admit to visual disturbance, but again, did not seek medical intervention  He lives alone, but has spoken to his daughter, who is a nurse in [place]  He is feeling better, but not back to normal   He refuses to go to the ER for further evaluation  He has a long hx of PAD and PVD, and certainly has risk factors for CVA, which he is well aware of  I suggested a stat MRI of brain, but he refuses at this time  He does agree to be seen in the office tomorrow  He will see Dr NUNN  He remains on daily aspirin  Will not increase dose until a bleed is ruled out  Pt voices understanding of the above, and also of the risks of future CVA

## 2018-09-28 ENCOUNTER — OFFICE VISIT (OUTPATIENT)
Dept: FAMILY MEDICINE CLINIC | Facility: CLINIC | Age: 78
End: 2018-09-28
Payer: MEDICARE

## 2018-09-28 VITALS
HEART RATE: 68 BPM | BODY MASS INDEX: 26.74 KG/M2 | WEIGHT: 146.2 LBS | SYSTOLIC BLOOD PRESSURE: 142 MMHG | DIASTOLIC BLOOD PRESSURE: 80 MMHG

## 2018-09-28 DIAGNOSIS — E87.1 HYPONATREMIA: ICD-10-CM

## 2018-09-28 DIAGNOSIS — I63.9 CEREBROVASCULAR ACCIDENT (CVA), UNSPECIFIED MECHANISM (HCC): Primary | ICD-10-CM

## 2018-09-28 DIAGNOSIS — I70.219 ATHEROSCLEROSIS OF NATIVE ARTERY OF LOWER EXTREMITY WITH INTERMITTENT CLAUDICATION, UNSPECIFIED LATERALITY (HCC): ICD-10-CM

## 2018-09-28 DIAGNOSIS — I49.9 CARDIAC ARRHYTHMIA, UNSPECIFIED CARDIAC ARRHYTHMIA TYPE: ICD-10-CM

## 2018-09-28 DIAGNOSIS — E78.00 PURE HYPERCHOLESTEROLEMIA: ICD-10-CM

## 2018-09-28 DIAGNOSIS — I77.9 BILATERAL CAROTID ARTERY DISEASE (HCC): ICD-10-CM

## 2018-09-28 PROCEDURE — 93000 ELECTROCARDIOGRAM COMPLETE: CPT | Performed by: FAMILY MEDICINE

## 2018-09-28 PROCEDURE — 99215 OFFICE O/P EST HI 40 MIN: CPT | Performed by: FAMILY MEDICINE

## 2018-09-28 NOTE — ASSESSMENT & PLAN NOTE
Based on the symptoms that the patient reported, he does have a stroke  He has a persistent neurologic deficit more than 72 hr   I would recommend MRI stat to rule out any problems  We can further follow-up after that  Of note, if he cannot get the MRI stat, it would be more than reasonable to wait till Monday or Tuesday

## 2018-09-28 NOTE — ASSESSMENT & PLAN NOTE
EKG today did not show any arrhythmia, but on exam he did have some dropped beats  There was a slight intraventricular conduction delay, and in the future we could consider a Holter monitor  We do need to make sure that we check blood work, and follow up after that

## 2018-09-28 NOTE — ASSESSMENT & PLAN NOTE
Patient does have some bruits bilaterally  These are essentially unchanged from before  He has follow-up with vascular scheduled at some point in the near future

## 2018-09-28 NOTE — PROGRESS NOTES
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)  Stable no current symptoms  Bilateral carotid artery disease ([facility] )  Patient does have some bruits bilaterally  These are essentially unchanged from before  He has follow-up with vascular scheduled at some point in the near future  CVA (cerebral vascular accident) [facility])  Based on the symptoms that the patient reported, he does have a stroke  He has a persistent neurologic deficit more than 72 hr   I would recommend MRI stat to rule out any problems  We can further follow-up after that  Of note, if he cannot get the MRI stat, it would be more than reasonable to wait till Monday or Tuesday  Hyperlipidemia  Patient does have hyperlipidemia, so I would recommend getting that checked  He has atorvastatin 10 mg that he currently takes  Hyponatremia  Recommend check laboratory studies  He did have hyponatremia at 1 point  This may account for some of the symptoms, but I do not expect that is all of them  Diagnoses and all orders for this visit:    Cerebrovascular accident (CVA), unspecified mechanism ([facility] )  -     POCT ECG  -     CBC and differential; Future  -     TSH, 3rd generation; Future  -     Comprehensive metabolic panel; Future  -     Lipid Panel with Direct LDL reflex; Future  -     MRI brain wo contrast; Future    Cardiac arrhythmia, unspecified cardiac arrhythmia type  -     POCT ECG  -     CBC and differential; Future  -     TSH, 3rd generation; Future  -     Comprehensive metabolic panel;  Future    Bilateral carotid artery disease ([facility] )    Pure hypercholesterolemia  -     Comprehensive metabolic panel; Future  -     Lipid Panel with Direct LDL reflex; Future    Hyponatremia  -     Comprehensive metabolic panel; Future    Atherosclerosis of native artery of lower extremity with intermittent claudication, unspecified laterality (HCC)          Subjective:   CC: c/o visual problems and memory issues over the last week  Patient ID: Ayaka Olson is a [de-identified] y o  male  Please see CC  Reviewed note from Dr Twyla Wan  Patient has had some changes  He is having some changes acutely  He was having problems with opening garage door, couldn't remember TV controls, or computer passwords  He aslo mentioned eyesight problems  He has had some improvement in memory issues, but it is not gone  He states that he was fine before this started on Thursday  Vision: He is noting increased changes in vision since the event  He has problems reading, but not necessarily blurry  He feels like there is a shade cutting off the right visual field  The following portions of the patient's history were reviewed and updated as appropriate: allergies, current medications, past family history, past medical history, past social history, past surgical history and problem list     Review of Systems   Constitutional: Negative  HENT: Negative  Neurological:        Per HPI   All other systems reviewed and are negative  Objective:      Vitals:    09/28/18 1342   BP: 142/80   BP Location: Left arm   Patient Position: Sitting   Pulse: 68   Weight: 66 3 kg (146 lb 3 2 oz)            Physical Exam   Constitutional: He is oriented to person, place, and time  He appears well-developed and well-nourished  No distress  HENT:   Head: Normocephalic and atraumatic  Mouth/Throat: Oropharynx is clear and moist  No oropharyngeal exudate  Eyes: Pupils are equal, round, and reactive to light  Conjunctivae and EOM are normal    Neck: Normal range of motion  Neck supple  No tracheal deviation present   No thyromegaly present  Cardiovascular: Normal rate, regular rhythm, normal heart sounds and intact distal pulses  Exam reveals no gallop and no friction rub  No murmur heard  Pulses:       Carotid pulses are 2+ on the right side with bruit, and 2+ on the left side with bruit  Pulmonary/Chest: Effort normal and breath sounds normal  No respiratory distress  He has no wheezes  He has no rales  Abdominal: Soft  Bowel sounds are normal  He exhibits no distension  There is no tenderness  Neurological: He is alert and oriented to person, place, and time  Coordination normal    Skin: Skin is warm and dry  He is not diaphoretic  Psychiatric: He has a normal mood and affect  His behavior is normal  Judgment and thought content normal    Nursing note and vitals reviewed

## 2018-09-28 NOTE — PATIENT INSTRUCTIONS
Problem List Items Addressed This Visit        Cardiovascular and Mediastinum    Bilateral carotid artery disease ([facility] )     Patient does have some bruits bilaterally  These are essentially unchanged from before  He has follow-up with vascular scheduled at some point in the near future  Arrhythmia     EKG today did not show any arrhythmia, but on exam he did have some dropped beats  There was a slight intraventricular conduction delay, and in the future we could consider a Holter monitor  We do need to make sure that we check blood work, and follow up after that  Relevant Orders    POCT ECG (Completed)    CBC and differential    TSH, 3rd generation    Comprehensive metabolic panel    CVA (cerebral vascular accident) ([facility] ) - Primary     Based on the symptoms that the patient reported, he does have a stroke  He has a persistent neurologic deficit more than 72 hr   I would recommend MRI stat to rule out any problems  We can further follow-up after that  Of note, if he cannot get the MRI stat, it would be more than reasonable to wait till Monday or Tuesday  Relevant Orders    POCT ECG (Completed)    CBC and differential    TSH, 3rd generation    Comprehensive metabolic panel    Lipid Panel with Direct LDL reflex    MRI brain wo contrast       Other    Hyponatremia     Recommend check laboratory studies  He did have hyponatremia at 1 point  This may account for some of the symptoms, but I do not expect that is all of them  Relevant Orders    Comprehensive metabolic panel    Hyperlipidemia     Patient does have hyperlipidemia, so I would recommend getting that checked  He has atorvastatin 10 mg that he currently takes  Relevant Orders    Comprehensive metabolic panel    Lipid Panel with Direct LDL reflex    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

## 2018-09-28 NOTE — ASSESSMENT & PLAN NOTE
Recommend check laboratory studies  He did have hyponatremia at 1 point  This may account for some of the symptoms, but I do not expect that is all of them

## 2018-10-01 ENCOUNTER — APPOINTMENT (OUTPATIENT)
Dept: LAB | Facility: CLINIC | Age: 78
End: 2018-10-01
Payer: MEDICARE

## 2018-10-01 DIAGNOSIS — E87.1 HYPONATREMIA: Primary | ICD-10-CM

## 2018-10-01 DIAGNOSIS — I49.9 CARDIAC ARRHYTHMIA, UNSPECIFIED CARDIAC ARRHYTHMIA TYPE: ICD-10-CM

## 2018-10-01 DIAGNOSIS — I63.9 CEREBROVASCULAR ACCIDENT (CVA), UNSPECIFIED MECHANISM (HCC): ICD-10-CM

## 2018-10-01 DIAGNOSIS — E87.1 HYPONATREMIA: ICD-10-CM

## 2018-10-01 DIAGNOSIS — E78.00 PURE HYPERCHOLESTEROLEMIA: ICD-10-CM

## 2018-10-01 LAB
ALBUMIN SERPL BCP-MCNC: 3.9 G/DL (ref 3.5–5)
ALP SERPL-CCNC: 76 U/L (ref 46–116)
ALT SERPL W P-5'-P-CCNC: 24 U/L (ref 12–78)
ANION GAP SERPL CALCULATED.3IONS-SCNC: 7 MMOL/L (ref 4–13)
AST SERPL W P-5'-P-CCNC: 20 U/L (ref 5–45)
BASOPHILS # BLD AUTO: 0.08 THOUSANDS/ΜL (ref 0–0.1)
BASOPHILS NFR BLD AUTO: 1 % (ref 0–1)
BILIRUB SERPL-MCNC: 0.57 MG/DL (ref 0.2–1)
BUN SERPL-MCNC: 19 MG/DL (ref 5–25)
CALCIUM SERPL-MCNC: 9.2 MG/DL (ref 8.3–10.1)
CHLORIDE SERPL-SCNC: 94 MMOL/L (ref 100–108)
CHOLEST SERPL-MCNC: 204 MG/DL (ref 50–200)
CO2 SERPL-SCNC: 28 MMOL/L (ref 21–32)
CREAT SERPL-MCNC: 1.14 MG/DL (ref 0.6–1.3)
EOSINOPHIL # BLD AUTO: 0.15 THOUSAND/ΜL (ref 0–0.61)
EOSINOPHIL NFR BLD AUTO: 2 % (ref 0–6)
ERYTHROCYTE [DISTWIDTH] IN BLOOD BY AUTOMATED COUNT: 12.9 % (ref 11.6–15.1)
GFR SERPL CREATININE-BSD FRML MDRD: 60 ML/MIN/1.73SQ M
GLUCOSE P FAST SERPL-MCNC: 92 MG/DL (ref 65–99)
HCT VFR BLD AUTO: 37.6 % (ref 36.5–49.3)
HDLC SERPL-MCNC: 72 MG/DL (ref 40–60)
HGB BLD-MCNC: 12.6 G/DL (ref 12–17)
IMM GRANULOCYTES # BLD AUTO: 0.02 THOUSAND/UL (ref 0–0.2)
IMM GRANULOCYTES NFR BLD AUTO: 0 % (ref 0–2)
LDLC SERPL CALC-MCNC: 111 MG/DL (ref 0–100)
LYMPHOCYTES # BLD AUTO: 2.02 THOUSANDS/ΜL (ref 0.6–4.47)
LYMPHOCYTES NFR BLD AUTO: 33 % (ref 14–44)
MCH RBC QN AUTO: 29 PG (ref 26.8–34.3)
MCHC RBC AUTO-ENTMCNC: 33.5 G/DL (ref 31.4–37.4)
MCV RBC AUTO: 87 FL (ref 82–98)
MONOCYTES # BLD AUTO: 0.64 THOUSAND/ΜL (ref 0.17–1.22)
MONOCYTES NFR BLD AUTO: 10 % (ref 4–12)
NEUTROPHILS # BLD AUTO: 3.27 THOUSANDS/ΜL (ref 1.85–7.62)
NEUTS SEG NFR BLD AUTO: 54 % (ref 43–75)
NRBC BLD AUTO-RTO: 0 /100 WBCS
PLATELET # BLD AUTO: 346 THOUSANDS/UL (ref 149–390)
PMV BLD AUTO: 9.7 FL (ref 8.9–12.7)
POTASSIUM SERPL-SCNC: 4 MMOL/L (ref 3.5–5.3)
PROT SERPL-MCNC: 7.4 G/DL (ref 6.4–8.2)
RBC # BLD AUTO: 4.34 MILLION/UL (ref 3.88–5.62)
SODIUM SERPL-SCNC: 129 MMOL/L (ref 136–145)
TRIGL SERPL-MCNC: 105 MG/DL
TSH SERPL DL<=0.05 MIU/L-ACNC: 1.5 UIU/ML (ref 0.36–3.74)
WBC # BLD AUTO: 6.18 THOUSAND/UL (ref 4.31–10.16)

## 2018-10-01 PROCEDURE — 84443 ASSAY THYROID STIM HORMONE: CPT

## 2018-10-01 PROCEDURE — 80061 LIPID PANEL: CPT

## 2018-10-01 PROCEDURE — 85025 COMPLETE CBC W/AUTO DIFF WBC: CPT

## 2018-10-01 PROCEDURE — 36415 COLL VENOUS BLD VENIPUNCTURE: CPT

## 2018-10-01 PROCEDURE — 80053 COMPREHEN METABOLIC PANEL: CPT

## 2018-10-03 ENCOUNTER — OFFICE VISIT (OUTPATIENT)
Dept: FAMILY MEDICINE CLINIC | Facility: CLINIC | Age: 78
End: 2018-10-03
Payer: MEDICARE

## 2018-10-03 VITALS
SYSTOLIC BLOOD PRESSURE: 160 MMHG | DIASTOLIC BLOOD PRESSURE: 68 MMHG | BODY MASS INDEX: 26.52 KG/M2 | HEART RATE: 56 BPM | WEIGHT: 145 LBS

## 2018-10-03 DIAGNOSIS — E87.1 HYPONATREMIA: ICD-10-CM

## 2018-10-03 DIAGNOSIS — I70.219 ATHEROSCLEROSIS OF NATIVE ARTERY OF LOWER EXTREMITY WITH INTERMITTENT CLAUDICATION, UNSPECIFIED LATERALITY (HCC): ICD-10-CM

## 2018-10-03 DIAGNOSIS — I63.9 CEREBROVASCULAR ACCIDENT (CVA), UNSPECIFIED MECHANISM (HCC): Primary | ICD-10-CM

## 2018-10-03 DIAGNOSIS — Z23 NEED FOR INFLUENZA VACCINATION: ICD-10-CM

## 2018-10-03 DIAGNOSIS — I25.810 CORONARY ARTERY DISEASE INVOLVING CORONARY BYPASS GRAFT OF NATIVE HEART WITHOUT ANGINA PECTORIS: ICD-10-CM

## 2018-10-03 DIAGNOSIS — E78.2 MIXED HYPERLIPIDEMIA: ICD-10-CM

## 2018-10-03 DIAGNOSIS — I65.02 STENOSIS OF LEFT VERTEBRAL ARTERY: ICD-10-CM

## 2018-10-03 PROBLEM — I25.10 CAD (CORONARY ARTERY DISEASE): Status: ACTIVE | Noted: 2018-10-03

## 2018-10-03 PROCEDURE — G0008 ADMIN INFLUENZA VIRUS VAC: HCPCS | Performed by: FAMILY MEDICINE

## 2018-10-03 PROCEDURE — 90662 IIV NO PRSV INCREASED AG IM: CPT | Performed by: FAMILY MEDICINE

## 2018-10-03 PROCEDURE — G0439 PPPS, SUBSEQ VISIT: HCPCS | Performed by: FAMILY MEDICINE

## 2018-10-03 PROCEDURE — 99214 OFFICE O/P EST MOD 30 MIN: CPT | Performed by: FAMILY MEDICINE

## 2018-10-03 NOTE — ASSESSMENT & PLAN NOTE
MRI did show that the patient has had multiple old, as well as some new strokes  None appear to be hemorrhagic, there was discussion of micro hemorrhages  At this point, would recommend that he speak with Neurology about possible anticoagulation, i e  increased aspirin versus other  Patient did try Plavix previously, but cannot recall exactly what the side effect was that he was having  He felt he had some palpitations due to this  Again, would recommend an opinion from Neurology at some point  This is also where I would recommend follow-up with Ophthalmology as he has had some visual changes, and there are some stroke findings with regard to occipital lobes

## 2018-10-03 NOTE — ASSESSMENT & PLAN NOTE
No current cardiac symptoms  Follow-up as needed  Status post prior bypass and stents when he was in his 45s

## 2018-10-03 NOTE — PATIENT INSTRUCTIONS
Problem List Items Addressed This Visit        Cardiovascular and Mediastinum    CVA (cerebral vascular accident) ([address] ) - Primary     MRI did show that the patient has had multiple old, as well as some new strokes  None appear to be hemorrhagic, there was discussion of micro hemorrhages  At this point, would recommend that he speak with Neurology about possible anticoagulation, i e  increased aspirin versus other  Patient did try Plavix previously, but cannot recall exactly what the side effect was that he was having  He felt he had some palpitations due to this  Again, would recommend an opinion from Neurology at some point  This is also where I would recommend follow-up with Ophthalmology as he has had some visual changes, and there are some stroke findings with regard to occipital lobes  Relevant Orders    Ambulatory referral to Ophthalmology    Ambulatory referral to Neurology    CAD (coronary artery disease)     No current cardiac symptoms  Follow-up as needed  Status post prior bypass and stents when he was in his 45s  Other    Hyponatremia     Recommend discontinue the Magnesium Citrate as it may be causing some of the hyponatremia  I would like to recheck the BMP in 1 week  Relevant Orders    Basic metabolic panel    Hyperlipidemia     Patient is currently on Lipitor at 10 mg daily  He has had some aches and pains in the past, so I am very hesitant to increase the Lipitor  His LDL goal would be less than 80, is currently at 110  Also, his HDL is excellent at 72  Again, no changes at the moment secondary to increased myalgias  Atherosclerosis of native artery of extremity with intermittent claudication [facility])     He is still having claudication symptoms at time  He feels that this has gotten worse over the last 30 or so years  At this point, would not make any adjustments other than continuing to recommend exercise  Patient does see Dr Husam Webb for this  Would recommend that he speak about the vertebral occlusion with Dr Hrenan Britt  Stenosis of left vertebral artery     Noted on the recent MRI  Follow with Vascular

## 2018-10-03 NOTE — PROGRESS NOTES
Assessment/Plan:    Hyponatremia  Recommend discontinue the Magnesium Citrate as it may be causing some of the hyponatremia  I would like to recheck the BMP in 1 week  Atherosclerosis of native artery of extremity with intermittent claudication [facility])  He is still having claudication symptoms at time  He feels that this has gotten worse over the last 30 or so years  At this point, would not make any adjustments other than continuing to recommend exercise  Patient does see Dr Tahmina Jackson for this  Would recommend that he speak about the vertebral occlusion with Dr Tahmina Jackson  CAD (coronary artery disease)  No current cardiac symptoms  Follow-up as needed  Status post prior bypass and stents when he was in his 45s  CVA (cerebral vascular accident) [facility])  MRI did show that the patient has had multiple old, as well as some new strokes  None appear to be hemorrhagic, there was discussion of micro hemorrhages  At this point, would recommend that he speak with Neurology about possible anticoagulation, i e  increased aspirin versus other  Patient did try Plavix previously, but cannot recall exactly what the side effect was that he was having  He felt he had some palpitations due to this  Again, would recommend an opinion from Neurology at some point  This is also where I would recommend follow-up with Ophthalmology as he has had some visual changes, and there are some stroke findings with regard to occipital lobes  Stenosis of left vertebral artery  Noted on the recent MRI  Follow with Vascular  Hyperlipidemia  Patient is currently on Lipitor at 10 mg daily  He has had some aches and pains in the past, so I am very hesitant to increase the Lipitor  His LDL goal would be less than 80, is currently at 110  Also, his HDL is excellent at 72  Again, no changes at the moment secondary to increased myalgias         Diagnoses and all orders for this visit:    Cerebrovascular accident (CVA), unspecified mechanism [facility])  -     Ambulatory referral to Ophthalmology; Future  -     Ambulatory referral to Neurology; Future    Stenosis of left vertebral artery    Hyponatremia  -     Basic metabolic panel; Future    Atherosclerosis of native artery of lower extremity with intermittent claudication, unspecified laterality ([address] )    Coronary artery disease involving coronary bypass graft of native heart without angina pectoris    Mixed hyperlipidemia          Subjective: patient is here to review his MRI and blood work  results  ak     Patient ID: Saurabh Peng is a [de-identified] y o  male  Patient is currently taking atorvastatin for cholesterol  He did mention that he is taking an over-the-counter medication with magnesium citrate for its calming effects, as well as decreasing aches  He has noticed some decreased aches with using this, but he also does have the side effect of loose stools  Reviewed MRI  He has had low sodium  Has used salt on food at times  With regard to his stroke, the patient is currently on aspirin only  He is not on any anticoagulants other than this  He did have problems previously with Plavix  For hypertension he is currently on Norvasc, atenolol, hydrochlorothiazide  He has had multiple areas of peripheral vascular disease previously  [facility] vascular [facility]  The following portions of the patient's history were reviewed and updated as appropriate: allergies, current medications, past family history, past medical history, past social history, past surgical history and problem list     Review of Systems   Constitutional: Negative  HENT: Negative  Eyes: Positive for visual disturbance  Respiratory: Negative  Cardiovascular: Negative  Neurological: Positive for dizziness  All other systems reviewed and are negative  MRI brain reviewed  Please see chart copy  Labs reviewed  Please see chart  CBC was normal   TSH 1 5    Total cholesterol 204, , HDL 72, triglycerides 105  Comprehensive metabolic profile was reviewed  Shows a sodium of 129  Potassium 4 0 %period% creatinine 1 14, GFR 60  Blood sugar 92  AST 20, ALT 24  Objective:      /68   Pulse 56   Wt 65 8 kg (145 lb)   BMI 26 52 kg/m²          Physical Exam   Constitutional: He appears well-developed and well-nourished  Nursing note and vitals reviewed

## 2018-10-03 NOTE — PROGRESS NOTES
Assessment and Plan:    Problem List Items Addressed This Visit        Cardiovascular and Mediastinum    CVA (cerebral vascular accident) ([facility] [address] ) - Primary     MRI did show that the patient has had multiple old, as well as some new strokes  None appear to be hemorrhagic, there was discussion of micro hemorrhages  At this point, would recommend that he speak with Neurology about possible anticoagulation, i e  increased aspirin versus other  Patient did try Plavix previously, but cannot recall exactly what the side effect was that he was having  He felt he had some palpitations due to this  Again, would recommend an opinion from Neurology at some point  This is also where I would recommend follow-up with Ophthalmology as he has had some visual changes, and there are some stroke findings with regard to occipital lobes  Relevant Orders    Ambulatory referral to Ophthalmology    Ambulatory referral to Neurology    CAD (coronary artery disease)     No current cardiac symptoms  Follow-up as needed  Status post prior bypass and stents when he was in his 45s  Other    Hyponatremia     Recommend discontinue the Magnesium Citrate as it may be causing some of the hyponatremia  I would like to recheck the BMP in 1 week  Relevant Orders    Basic metabolic panel    Hyperlipidemia     Patient is currently on Lipitor at 10 mg daily  He has had some aches and pains in the past, so I am very hesitant to increase the Lipitor  His LDL goal would be less than 80, is currently at 110  Also, his HDL is excellent at 72  Again, no changes at the moment secondary to increased myalgias  Atherosclerosis of native artery of extremity with intermittent claudication [facility])     He is still having claudication symptoms at time  He feels that this has gotten worse over the last 30 or so years  At this point, would not make any adjustments other than continuing to recommend exercise    Patient does see Dr Ria Jones for this  Would recommend that he speak about the vertebral occlusion with Dr Ria Jones  Stenosis of left vertebral artery     Noted on the recent MRI  Follow with Vascular  Other Visit Diagnoses     Need for influenza vaccination        Relevant Orders    influenza vaccine, 2882-5005, high-dose, PF 0 5 mL, for patients 65 yr+ (FLUZONE HIGH-DOSE) (Completed)        Health Maintenance Due   Topic Date Due    DTaP,Tdap,and Td Vaccines (1 - Tdap) 05/[birth year]/1959    Pneumococcal PPSV23/PCV13 65+ Years / Low and Medium Risk (1 of 2 - PCV13) 05/22/2003         HPI:  Ewa Lim is a [de-identified] y o  male here for his Subsequent Wellness Visit  Patient Active Problem List   Diagnosis    Peripheral vascular disease ([facility] [address] )    Osteoarthrosis    Hyponatremia    Hyperlipidemia    GERD without esophagitis    Benign familial tremor    Occlusion and stenosis of carotid artery    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)    Bilateral carotid artery disease ([address] )    Arrhythmia    CVA (cerebral vascular accident) ([facility] [address] )    Stenosis of left vertebral artery    CAD (coronary artery disease)     History reviewed  No pertinent past medical history    Past Surgical History:   Procedure Laterality Date    CORONARY ARTERY BYPASS GRAFT       Family History   Problem Relation Age of Onset    Hypertension Mother     Coronary artery disease Son      History   Smoking Status    Never Smoker   Smokeless Tobacco    Never Used     Comment: former smoker noted in "allscripts" - no secondhand smoke exposure      History   Alcohol Use    Yes     Comment: social      History   Drug Use No       Current Outpatient Prescriptions   Medication Sig Dispense Refill    amLODIPine (NORVASC) 10 mg tablet take 1 tablet by mouth once daily 90 tablet 3    aspirin (ASPIRIN ADULT LOW DOSE) 81 mg EC tablet Take 2 tablets by mouth daily      atenolol (TENORMIN) 25 mg tablet take 1/2 tablet by mouth once daily 45 tablet 3    atorvastatin (LIPITOR) 10 mg tablet Take 1 tablet by mouth daily      enalapril (VASOTEC) 20 mg tablet take 1 tablet by mouth twice a day as directed 180 tablet 3    hydrochlorothiazide (HYDRODIURIL) 25 mg tablet take 1/2 tablet by mouth daily 90 tablet 1    meloxicam (MOBIC) 15 mg tablet take 1 tablet by mouth once daily 30 tablet 3    nitroglycerin (NITROSTAT) 0 4 mg SL tablet Place under the tongue      omeprazole (PriLOSEC) 20 mg delayed release capsule take 1 capsule by mouth once daily 90 capsule 0     No current facility-administered medications for this visit  Allergies   Allergen Reactions    Doxycycline      Immunization History   Administered Date(s) Administered    Influenza Split High Dose Preservative Free IM 08/23/2013, 10/10/2016, 10/03/2017    Influenza TIV (IM) 1940, 10/21/2012, 10/13/2015    Influenza, high dose seasonal 0 5 mL 10/03/2018    Pneumococcal Polysaccharide PPV23 1940    Zoster 1940       Patient Care Team:  Radha East MD as PCP - General    Medicare Screening Tests and Risk Assessments:  Katelin Rios is here for his Subsequent Wellness visit  Health Risk Assessment:  Patient rates overall health as fair  Patient feels that their physical health rating is Slightly worse  Eyesight was rated as Slightly worse  Hearing was rated as Same  Patient feels that their emotional and mental health rating is Same  Pain experienced by patient in the last 7 days has been None  Patient states that he has experienced no weight loss or gain in last 6 months  Emotional/Mental Health:  Patient has been feeling nervous/anxious  PHQ-9 Depression Screening:    Frequency of the following problems over the past two weeks:      1  Little interest or pleasure in doing things: 0 - not at all      2  Feeling down, depressed, or hopeless: 0 - not at all  PHQ-2 Score: 0          Broken Bones/Falls:     Fall Risk Assessment:    In the past year, patient has experienced: No history of falling in past year          Bladder/Bowel:  Patient has not leaked urine accidently in the last six months  Patient reports no loss of bowel control  Immunizations:  Patient has had a flu vaccination within the last year  Patient has not received a pneumonia shot  Patient has received a shingles shot  Patient has not received tetanus/diphtheria shot  Home Safety:  Patient does not have trouble with stairs inside or outside of their home  Patient currently reports that there are no safety hazards present in home, working smoke alarms, no working carbon monoxide detectors  Preventative Screenings:   no prostate cancer screen performed, colon cancer screen completed, cholesterol screen completed, glaucoma eye exam completed,     Nutrition:  Current diet: Regular and Limited junk food with servings of the following:    Medications:  Patient is currently taking over-the-counter supplements  List of OTC medications includes: fishoil, magnesium, coq10  Patient is able to manage medications  Lifestyle Choices:  Patient reports no tobacco use  Patient has smoked or used tobacco in the past   Patient has stopped his tobacco use  Tobacco use quit date: 1970  Patient reports alcohol use  Patient drives a vehicle  Patient wears seat belt  Activities of Daily Living:  Can get out of bed by his or her self, able to dress self, able to make own meals, able to do own shopping, able to bathe self, can do own laundry/housekeeping, can manage own money, pay bills and track expenses    Previous Hospitalizations:  Hospitalization or ED visit in past 12 months  Number of hospitalizations within the last year: 1-2        Advanced Directives:  Patient has decided on a power of   Patient has completed advanced directive          Preventative Screening/Counseling:      Cardiovascular:      General: Risks and Benefits Discussed and Screening Current          Diabetes:      General: Risks and Benefits Discussed, Screening Current and Screening Not Indicated          Colorectal Cancer:      General: Risks and Benefits Discussed and Patient Declines          Prostate Cancer:      General: Risks and Benefits Discussed and Patient Declines          Osteoporosis:      General: Risks and Benefits Discussed and Patient Declines          AAA:      General: Patient Declines          Glaucoma:      General: Risks and Benefits Discussed and Screening Current      Referrals: Ophthalmology          HIV:      General: Screening Not Indicated          Hepatitis C:      General: Screening Not Indicated        Advanced Directives:   Patient has living will for healthcare, has durable POA for healthcare, patient has an advanced directive

## 2018-10-03 NOTE — ASSESSMENT & PLAN NOTE
Patient is currently on Lipitor at 10 mg daily  He has had some aches and pains in the past, so I am very hesitant to increase the Lipitor  His LDL goal would be less than 80, is currently at 110  Also, his HDL is excellent at 72  Again, no changes at the moment secondary to increased myalgias

## 2018-10-03 NOTE — LETTER
October 3, 2018     Jose A Daislva MD  [address]     Patient: Ewa Lim   YOB: 1940   Date of Visit: 10/3/2018       Dear Dr Tyra Pedersen: Thank you for referring Ewa Lim to me for evaluation  Below are my notes for this consultation  If you have questions, please do not hesitate to call me  I look forward to following your patient along with you  Sincerely,        Rita Gavin MD        CC: MD Rita Bartholomew MD  10/3/2018  1:59 PM  Sign at close encounter  Assessment/Plan:    Hyponatremia  Recommend discontinue the Magnesium Citrate as it may be causing some of the hyponatremia  I would like to recheck the BMP in 1 week  Atherosclerosis of native artery of extremity with intermittent claudication [facility])  He is still having claudication symptoms at time  He feels that this has gotten worse over the last 30 or so years  At this point, would not make any adjustments other than continuing to recommend exercise  Patient does see Dr Ria Jones for this  Would recommend that he speak about the vertebral occlusion with Dr Ria Jones  CAD (coronary artery disease)  No current cardiac symptoms  Follow-up as needed  Status post prior bypass and stents when he was in his 45s  CVA (cerebral vascular accident) [facility])  MRI did show that the patient has had multiple old, as well as some new strokes  None appear to be hemorrhagic, there was discussion of micro hemorrhages  At this point, would recommend that he speak with Neurology about possible anticoagulation, i e  increased aspirin versus other  Patient did try Plavix previously, but cannot recall exactly what the side effect was that he was having  He felt he had some palpitations due to this  Again, would recommend an opinion from Neurology at some point    This is also where I would recommend follow-up with Ophthalmology as he has had some visual changes, and there are some stroke findings with regard to occipital lobes  Stenosis of left vertebral artery  Noted on the recent MRI  Follow with Vascular  Hyperlipidemia  Patient is currently on Lipitor at 10 mg daily  He has had some aches and pains in the past, so I am very hesitant to increase the Lipitor  His LDL goal would be less than 80, is currently at 110  Also, his HDL is excellent at 72  Again, no changes at the moment secondary to increased myalgias  Diagnoses and all orders for this visit:    Cerebrovascular accident (CVA), unspecified mechanism ([facility] [address] )  -     Ambulatory referral to Ophthalmology; Future  -     Ambulatory referral to Neurology; Future    Stenosis of left vertebral artery    Hyponatremia  -     Basic metabolic panel; Future    Atherosclerosis of native artery of lower extremity with intermittent claudication, unspecified laterality ([facility] [address] )    Coronary artery disease involving coronary bypass graft of native heart without angina pectoris    Mixed hyperlipidemia          Subjective: patient is here to review his MRI and blood work  results  ak     Patient ID: Payal Murillo is a [de-identified] y o  male  Patient is currently taking atorvastatin for cholesterol  He did mention that he is taking an over-the-counter medication with magnesium citrate for its calming effects, as well as decreasing aches  He has noticed some decreased aches with using this, but he also does have the side effect of loose stools  Reviewed MRI  He has had low sodium  Has used salt on food at times  The following portions of the patient's history were reviewed and updated as appropriate: allergies, current medications, past family history, past medical history, past social history, past surgical history and problem list     Review of Systems      MRI brain reviewed  Please see chart copy  Labs reviewed  Please see chart  CBC was normal   TSH 1 5    Total cholesterol 204, , HDL 72, triglycerides 105   Comprehensive metabolic profile was reviewed  Shows a sodium of 129  Potassium 4 0 %period% creatinine 1 14, GFR 60  Blood sugar 92  AST 20, ALT 24      Objective:      /68   Pulse 56   Wt 65 8 kg (145 lb)   BMI 26 52 kg/m²           Physical Exam

## 2018-10-03 NOTE — ASSESSMENT & PLAN NOTE
He is still having claudication symptoms at time  He feels that this has gotten worse over the last 30 or so years  At this point, would not make any adjustments other than continuing to recommend exercise  Patient does see Dr Fred Mendenhall for this  Would recommend that he speak about the vertebral occlusion with Dr Fred Mendenhall

## 2018-10-03 NOTE — ASSESSMENT & PLAN NOTE
Recommend discontinue the Magnesium Citrate as it may be causing some of the hyponatremia  I would like to recheck the BMP in 1 week

## 2018-10-04 ENCOUNTER — TELEPHONE (OUTPATIENT)
Dept: FAMILY MEDICINE CLINIC | Facility: CLINIC | Age: 78
End: 2018-10-04

## 2018-10-04 NOTE — TELEPHONE ENCOUNTER
Pt has been notified of TH response and recommendation  He requests we email info for ophthalmologists Dr Froilan Schrader [phone] or Dr Leon Diehl [phone]  Thank you!

## 2018-10-04 NOTE — TELEPHONE ENCOUNTER
Patient called he can't see Dr Merna Gandhi until November 30th  He wants to know if there is anyone else  He can see   Please give him a call at [phone]

## 2018-10-10 ENCOUNTER — APPOINTMENT (OUTPATIENT)
Dept: LAB | Facility: CLINIC | Age: 78
End: 2018-10-10
Payer: MEDICARE

## 2018-10-10 DIAGNOSIS — E87.1 HYPONATREMIA: ICD-10-CM

## 2018-10-10 LAB
ANION GAP SERPL CALCULATED.3IONS-SCNC: 6 MMOL/L (ref 4–13)
BUN SERPL-MCNC: 18 MG/DL (ref 5–25)
CALCIUM SERPL-MCNC: 8.9 MG/DL (ref 8.3–10.1)
CHLORIDE SERPL-SCNC: 95 MMOL/L (ref 100–108)
CO2 SERPL-SCNC: 27 MMOL/L (ref 21–32)
CREAT SERPL-MCNC: 1.21 MG/DL (ref 0.6–1.3)
GFR SERPL CREATININE-BSD FRML MDRD: 56 ML/MIN/1.73SQ M
GLUCOSE P FAST SERPL-MCNC: 97 MG/DL (ref 65–99)
POTASSIUM SERPL-SCNC: 3.9 MMOL/L (ref 3.5–5.3)
SODIUM SERPL-SCNC: 128 MMOL/L (ref 136–145)

## 2018-10-10 PROCEDURE — 36415 COLL VENOUS BLD VENIPUNCTURE: CPT

## 2018-10-10 PROCEDURE — 80048 BASIC METABOLIC PNL TOTAL CA: CPT

## 2018-10-18 ENCOUNTER — OFFICE VISIT (OUTPATIENT)
Dept: FAMILY MEDICINE CLINIC | Facility: CLINIC | Age: 78
End: 2018-10-18
Payer: MEDICARE

## 2018-10-18 VITALS
WEIGHT: 144 LBS | HEART RATE: 56 BPM | DIASTOLIC BLOOD PRESSURE: 70 MMHG | BODY MASS INDEX: 26.34 KG/M2 | SYSTOLIC BLOOD PRESSURE: 180 MMHG

## 2018-10-18 DIAGNOSIS — I10 ESSENTIAL HYPERTENSION: ICD-10-CM

## 2018-10-18 DIAGNOSIS — E87.1 HYPONATREMIA: ICD-10-CM

## 2018-10-18 DIAGNOSIS — I63.50 CEREBROVASCULAR ACCIDENT (CVA) DUE TO OCCLUSION OF CEREBRAL ARTERY (HCC): Primary | ICD-10-CM

## 2018-10-18 DIAGNOSIS — I65.23 BILATERAL CAROTID ARTERY STENOSIS: ICD-10-CM

## 2018-10-18 PROCEDURE — 99214 OFFICE O/P EST MOD 30 MIN: CPT | Performed by: FAMILY MEDICINE

## 2018-10-18 NOTE — ASSESSMENT & PLAN NOTE
Sodium level remains low  This has been a long-term problem for him  He is not sure that he wants to follow with Renal at this point  We will re-evaluate in the future  I did review his current medications, the does not seem to be 1 that specifically would cause hyponatremia  He does understand that significant hyponatremia may cause other problems

## 2018-10-18 NOTE — LETTER
October 18, 2018     Ramona Nissen, DO  [address] [place] [address]     Patient: Sharon Barrios   YOB: 1940   Date of Visit: 10/18/2018       Dear Dr Sarai Elizabeth:    Thank you for referring Sharon Barrios to me for evaluation  Below are my notes for this consultation  If you have questions, please do not hesitate to call me  I look forward to following your patient along with you  Sincerely,        Hillary Caba MD        CC: No Recipients  Hillary Caba MD  10/18/2018 11:23 AM  Incomplete  Assessment/Plan:    No problem-specific Assessment & Plan notes found for this encounter  There are no diagnoses linked to this encounter  Subjective: Patient here for a 2 week f/u re: CVA  Patient sees Neurology next week and sees Opthmology November 12 ak     Patient ID: Sharon Barrios is a [de-identified] y o  male  Sodium level is low again  He is off supplemental Magnesium  He was using it for cramps and found it helped his bowels  When the Na came back as still low, he restarted Mg, and feels better with bowels  For vision changes, he is seeing Dr Sarai Elizabeth on [id number]  He did mentin that his vision is still a bit off, but better than before  He has not had follow up with Nephrology, as he states he never got a phone call from them  Reviewed medications and treatment of hyponatremia  Patient does have carotid stenosis, most notably on the left  Has followed with vascular a past   Has an appointment scheduled with them in the future  The following portions of the patient's history were reviewed and updated as appropriate: allergies, current medications, past family history, past medical history, past social history, past surgical history and problem list     Review of Systems   HENT: Negative  Neurological: Positive for weakness   Negative for dizziness, tremors, seizures, syncope, facial asymmetry, speech difficulty, light-headedness, numbness and headaches  All other systems reviewed and are negative  Objective: There were no vitals taken for this visit  Physical Exam   Constitutional: He is oriented to person, place, and time  He appears well-developed and well-nourished  HENT:   Head: Normocephalic and atraumatic  Neck: Normal range of motion  Neck supple  Cardiovascular: Normal rate, regular rhythm and normal heart sounds  Exam reveals no gallop and no friction rub  No murmur heard  Pulses:       Carotid pulses are 2+ on the right side, and 2+ on the left side with bruit  Pulmonary/Chest: Effort normal and breath sounds normal  No respiratory distress  He has no wheezes  He has no rales  Neurological: He is alert and oriented to person, place, and time  He has normal reflexes  He displays normal reflexes  No cranial nerve deficit  He exhibits abnormal muscle tone (Weakness in the left upper extremity with extension, flexion, abduction, adduction  )  Coordination normal    Nursing note and vitals reviewed

## 2018-10-18 NOTE — ASSESSMENT & PLAN NOTE
Patient reports his symptoms from stroke have improved quite a bit  I would still recommend follow with Neurology for the specific question of anticoagulation or not  Otherwise, no changes  He is not currently in physical therapy or occupational therapy  I did offer him the option of going to that if he wished  Patient did have some vision changes after the CVA  Again, I would recommend that he follow with Ophthalmology for this  Will send a copy of today's note to Dr Solomon Sessions

## 2018-10-18 NOTE — PATIENT INSTRUCTIONS
Problem List Items Addressed This Visit        Cardiovascular and Mediastinum    Hypertension     Blood pressure today is elevated, but based on his lower diastolic I would be concerned about increasing any of his medication  Further, the patient is not interested in increasing medication at this point  He does understand that untreated hypertension increases the risk of a 2nd stroke  Bilateral carotid artery disease (HCC)     Stable  No changes  Follow with vascular as scheduled  CVA (cerebral vascular accident) [facility]) - Primary     Patient reports his symptoms from stroke have improved quite a bit  I would still recommend follow with Neurology for the specific question of anticoagulation or not  Otherwise, no changes  He is not currently in physical therapy or occupational therapy  I did offer him the option of going to that if he wished  Patient did have some vision changes after the CVA  Again, I would recommend that he follow with Ophthalmology for this  Will send a copy of today's note to Dr Ashanti Wilburn  Other    Hyponatremia     Sodium level remains low  This has been a long-term problem for him  He is not sure that he wants to follow with Renal at this point  We will re-evaluate in the future  I did review his current medications, the does not seem to be 1 that specifically would cause hyponatremia  He does understand that significant hyponatremia may cause other problems

## 2018-10-18 NOTE — PROGRESS NOTES
Assessment/Plan:    Bilateral carotid artery disease (HCC)  Stable  No changes  Follow with vascular as scheduled  CVA (cerebral vascular accident) [facility])  Patient reports his symptoms from stroke have improved quite a bit  I would still recommend follow with Neurology for the specific question of anticoagulation or not  Otherwise, no changes  He is not currently in physical therapy or occupational therapy  I did offer him the option of going to that if he wished  Patient did have some vision changes after the CVA  Again, I would recommend that he follow with Ophthalmology for this  Will send a copy of today's note to Dr Andres Solares  Hyponatremia  Sodium level remains low  This has been a long-term problem for him  He is not sure that he wants to follow with Renal at this point  We will re-evaluate in the future  I did review his current medications, the does not seem to be 1 that specifically would cause hyponatremia  He does understand that significant hyponatremia may cause other problems  Hypertension  Blood pressure today is elevated, but based on his lower diastolic I would be concerned about increasing any of his medication  Further, the patient is not interested in increasing medication at this point  He does understand that untreated hypertension increases the risk of a 2nd stroke  Diagnoses and all orders for this visit:    Cerebrovascular accident (CVA) due to occlusion of cerebral artery (HCC)    Hyponatremia    Bilateral carotid artery stenosis    Essential hypertension          Subjective: Patient here for a 2 week f/u re: CVA  Patient sees Neurology next week and sees Opthmology November 12  ak     Patient ID: Sulema Garcia is a [de-identified] y o  male  Sodium level is low again  He is off supplemental Magnesium  He was using it for cramps and found it helped his bowels  When the Na came back as still low, he restarted Mg, and feels better with bowels      For vision changes, he is seeing Dr Debi Peterson on [id number]  He did mentin that his vision is still a bit off, but better than before  He has not had follow up with Nephrology, as he states he never got a phone call from them  Reviewed medications and treatment of hyponatremia  Patient does have carotid stenosis, most notably on the left  Has followed with vascular a past   Has an appointment scheduled with them in the future  The following portions of the patient's history were reviewed and updated as appropriate: allergies, current medications, past family history, past medical history, past social history, past surgical history and problem list     Review of Systems   HENT: Negative  Neurological: Positive for weakness  Negative for dizziness, tremors, seizures, syncope, facial asymmetry, speech difficulty, light-headedness, numbness and headaches  All other systems reviewed and are negative  Objective:      BP (!) 180/70   Pulse 56   Wt 65 3 kg (144 lb)   BMI 26 34 kg/m²          Physical Exam   Constitutional: He is oriented to person, place, and time  He appears well-developed and well-nourished  HENT:   Head: Normocephalic and atraumatic  Neck: Normal range of motion  Neck supple  Cardiovascular: Normal rate, regular rhythm and normal heart sounds  Exam reveals no gallop and no friction rub  No murmur heard  Pulses:       Carotid pulses are 2+ on the right side, and 2+ on the left side with bruit  Pulmonary/Chest: Effort normal and breath sounds normal  No respiratory distress  He has no wheezes  He has no rales  Neurological: He is alert and oriented to person, place, and time  He has normal reflexes  He displays normal reflexes  No cranial nerve deficit  He exhibits abnormal muscle tone (Weakness in the left upper extremity with extension, flexion, abduction, adduction  )  Coordination normal    Nursing note and vitals reviewed

## 2018-10-18 NOTE — ASSESSMENT & PLAN NOTE
Blood pressure today is elevated, but based on his lower diastolic I would be concerned about increasing any of his medication  Further, the patient is not interested in increasing medication at this point  He does understand that untreated hypertension increases the risk of a 2nd stroke

## 2018-10-21 DIAGNOSIS — E78.00 PURE HYPERCHOLESTEROLEMIA: Primary | ICD-10-CM

## 2018-10-22 RX ORDER — ATORVASTATIN CALCIUM 10 MG/1
TABLET, FILM COATED ORAL
Qty: 90 TABLET | Refills: 3 | Status: SHIPPED | OUTPATIENT
Start: 2018-10-22 | End: 2019-10-20 | Stop reason: SDUPTHER

## 2018-10-23 ENCOUNTER — OFFICE VISIT (OUTPATIENT)
Dept: NEUROLOGY | Facility: CLINIC | Age: 78
End: 2018-10-23
Payer: MEDICARE

## 2018-10-23 ENCOUNTER — TELEPHONE (OUTPATIENT)
Dept: NEUROLOGY | Facility: CLINIC | Age: 78
End: 2018-10-23

## 2018-10-23 VITALS
HEART RATE: 72 BPM | WEIGHT: 140 LBS | SYSTOLIC BLOOD PRESSURE: 148 MMHG | DIASTOLIC BLOOD PRESSURE: 80 MMHG | BODY MASS INDEX: 25.61 KG/M2

## 2018-10-23 DIAGNOSIS — I63.9 CEREBROVASCULAR ACCIDENT (CVA), UNSPECIFIED MECHANISM (HCC): ICD-10-CM

## 2018-10-23 DIAGNOSIS — I65.02 STENOSIS OF LEFT VERTEBRAL ARTERY: ICD-10-CM

## 2018-10-23 DIAGNOSIS — I10 ESSENTIAL HYPERTENSION: ICD-10-CM

## 2018-10-23 DIAGNOSIS — I63.9 THALAMIC STROKE (HCC): Primary | ICD-10-CM

## 2018-10-23 PROBLEM — I63.81 THALAMIC STROKE (HCC): Status: ACTIVE | Noted: 2018-10-23

## 2018-10-23 PROCEDURE — 99204 OFFICE O/P NEW MOD 45 MIN: CPT | Performed by: PSYCHIATRY & NEUROLOGY

## 2018-10-23 RX ORDER — CLOPIDOGREL BISULFATE 75 MG/1
75 TABLET ORAL DAILY
Qty: 30 TABLET | Refills: 3 | Status: SHIPPED | OUTPATIENT
Start: 2018-10-23 | End: 2018-11-21 | Stop reason: SDUPTHER

## 2018-10-23 NOTE — TELEPHONE ENCOUNTER
Pt is confused why brilinta was ordered and sent to pharmacy  He states that you advised him that you were prescribing plavix  Called [email or web address] pharmacy and advised that Yury Velazquez was discontinued and plavix was prescribed  Per pharmacy, pt already picked up brilinta  Pt is aware that he should only take plavix, not brilinta  Pt asking for clarification to make sure that we're on the same page  Take Plavix 75 mg daily, not Brilinta-correct?

## 2018-10-23 NOTE — PROGRESS NOTES
Patient ID: Anu Day is a [de-identified] y o  male  Assessment/Plan: This is a [de-identified] y/oCaucasian Male who is here as a new patient to establish care, referred by Dr Mindy Maharaj MD  He does not want to establish care with us, he wants to see someone at [facility]   He did not want to do any workup today  PLAN:  -For secondary stroke prevention, discontinue aspirin, and start plavix and continue with atorvastatin    -Continue with BP control, goal < 130/80, her BP is at goal currently    -Continue to monitor DM and appropriate Euglycemic control  -Defer to PCP regarding DM and BP management  -Patient not smoking   -avoid using NSAIDs for headaches or other pain, recommend using tylenol   -no need for any additional workup at [facility]  Patient/Guardian was advised to the call the office if they have any questions and concerns in the meantime  Patient/Guardian does understand that if they have any new stroke like symptoms such as facial droop on one side, weakness/paralysis on either side, speech trouble, numbness on one side, balance issues, any vision changes, or any new headache, to call 9-1-1 immediately or to proceed to the nearest ER immediately          Problem List Items Addressed This Visit     Hypertension    CVA (cerebral vascular accident) ([address] )    Relevant Medications    ticagrelor (BRILINTA) 90 MG    Other Relevant Orders    CTA head and neck w wo contrast    Stenosis of left vertebral artery    Relevant Orders    Echo complete with contrast if indicated    Thalamic stroke ([address] ) - Primary    Relevant Medications    ticagrelor (BRILINTA) 90 MG    Other Relevant Orders    CTA head and neck w wo contrast    Echo complete with contrast if indicated           Subjective:    HPI    This is a [de-identified] y/o M with carotid endarterectomy, hypertension, peripheral vascular disease, hyperlipidemia, carotid stenosis who is here as a new patient to establish care, and referred by Dr Mindy Maharaj MD    He had stroke-like symptoms in September, he said he went for a walk and then he was doing chores around the house and he was having difficulty with trying to figure out how to work the garage door, and could not figure out how to use the TV remote and this lasted over a few days and he has vision issues on both eyes but more so he has trouble with right visual field and he went to PCP and then was went to [facility] for further evaluation few days later  I reviewed notes from PCP who recommended MRI brain  He had MRI brain which showed acute/subacute infarct in left thalamus and left medial temporal and occipital lobes  Also found to have 1 3 cm heterogeneous T1-T2 high signal abnormality in the left anterior temporal lobe possibly reflecting either cavernous malformation or cavernoma  MRI brain with contrast showed left vertebral artery either stenosis or occlusion  Patient was only on aspirin and atorvastatin for stroke prevention which he was on previously, and he is allergic to Plavix (he has claudication in his legs and was placed on plavix and he states that he did not do any good)  He confirms that he is not allergic to plavix  He is here now and overall is doing well  He has been having trouble with passwords and with math skills  He did not want to do anything at this point  The following portions of the patient's history were reviewed and updated as appropriate:   He  has no past medical history on file    He   Patient Active Problem List    Diagnosis Date Noted    Thalamic stroke ([facility] ) 10/23/2018    Stenosis of left vertebral artery 10/03/2018    CAD (coronary artery disease) 10/03/2018    Arrhythmia 09/28/2018    CVA (cerebral vascular accident) ([address] ) 09/28/2018    Hyponatremia 10/10/2016    Benign familial tremor 03/29/2016    Occlusion and stenosis of carotid artery 07/14/2015    Atherosclerosis of native artery of extremity with intermittent claudication ([address] ) 07/14/2015  Bilateral carotid artery disease ([facility] ) 02/18/2015    Peripheral vascular disease ([facility] ) 04/22/2013    Osteoarthrosis 03/03/2013    Hyperlipidemia 12/14/2012    GERD without esophagitis 08/13/2012    Hypertension 05/08/2012     He  has a past surgical history that includes Coronary artery bypass graft  His family history includes Coronary artery disease in his son; Hypertension in his mother  He  reports that he has never smoked  He has never used smokeless tobacco  He reports that he drinks alcohol  He reports that he does not use drugs  Current Outpatient Prescriptions   Medication Sig Dispense Refill    amLODIPine (NORVASC) 10 mg tablet take 1 tablet by mouth once daily 90 tablet 3    aspirin (ASPIRIN ADULT LOW DOSE) 81 mg EC tablet Take 2 tablets by mouth daily      atenolol (TENORMIN) 25 mg tablet take 1/2 tablet by mouth once daily 45 tablet 3    atorvastatin (LIPITOR) 10 mg tablet TAKE 1 TABLET DAILY AS DIRECTED  90 tablet 3    enalapril (VASOTEC) 20 mg tablet take 1 tablet by mouth twice a day as directed 180 tablet 3    hydrochlorothiazide (HYDRODIURIL) 25 mg tablet take 1/2 tablet by mouth daily 90 tablet 1    meloxicam (MOBIC) 15 mg tablet take 1 tablet by mouth once daily 30 tablet 3    nitroglycerin (NITROSTAT) 0 4 mg SL tablet Place under the tongue      omeprazole (PriLOSEC) 20 mg delayed release capsule take 1 capsule by mouth once daily 90 capsule 0    ticagrelor (BRILINTA) 90 MG Take 1 tablet (90 mg total) by mouth every 12 (twelve) hours 60 tablet 3     No current facility-administered medications for this visit        Current Outpatient Prescriptions on File Prior to Visit   Medication Sig    amLODIPine (NORVASC) 10 mg tablet take 1 tablet by mouth once daily    aspirin (ASPIRIN ADULT LOW DOSE) 81 mg EC tablet Take 2 tablets by mouth daily    atenolol (TENORMIN) 25 mg tablet take 1/2 tablet by mouth once daily    atorvastatin (LIPITOR) 10 mg tablet TAKE 1 TABLET DAILY AS DIRECTED   enalapril (VASOTEC) 20 mg tablet take 1 tablet by mouth twice a day as directed    hydrochlorothiazide (HYDRODIURIL) 25 mg tablet take 1/2 tablet by mouth daily    meloxicam (MOBIC) 15 mg tablet take 1 tablet by mouth once daily    nitroglycerin (NITROSTAT) 0 4 mg SL tablet Place under the tongue    omeprazole (PriLOSEC) 20 mg delayed release capsule take 1 capsule by mouth once daily     No current facility-administered medications on file prior to visit  He is allergic to doxycycline            Objective:    Blood pressure 148/80, pulse 72, weight 63 5 kg (140 lb)  Physical Exam  General: Patient is not in any acute distress  HEENT: normocephalic, atraumatic  Neck: supple  Heart: regular heart rate and rhythm, no murmurs, rubs and or gallops  Chest: Clear to auscultation bilaterally  Abdomen: soft and non-tender  Skin: no lesions  Musculosketal: no bony abnormalities  Neurologic Examination:   Mental status: alert, awake, and following commands  Speech: Speech is fluent without any dysarthria, no aphasia noted  Cranial Nerves: Pupils equal round reactive to light and extraocular movements intact  Visual fields full to confrontation  Facial sensation intact to soft touch in V1, V2 and V3 distributions  Face symmetric  Tongue midline, able to move side to side  Palate elevation symmetric uvula midline, no deviation noted  Shoulder shrug strong  Motor: Strength 5/5 in all 4 extremities  No drift  Normal rapid alternating movements  Normal tone  Sensory: Sensation intact to soft touch, vibration and pinprick in all 4 extremities  Cerebellar: Finger-to-nose intact, normal heel to shin  Reflexes: 2+ in all 4 extremities, downgoing toes bilaterally  Gait: Normal gait, able to tandem walk, able to tip-toe, able to walk on heels, normal arm to swing  Neurological Exam      ROS:  I personally reviewed his ROS  Review of Systems   Constitutional: Negative    Negative for appetite change and fever  HENT: Negative  Negative for hearing loss, tinnitus, trouble swallowing and voice change  Eyes: Positive for visual disturbance  Negative for photophobia and pain  Respiratory: Negative  Negative for shortness of breath  Cardiovascular: Negative  Negative for palpitations  Gastrointestinal: Negative  Negative for nausea and vomiting  Endocrine: Negative  Negative for cold intolerance and heat intolerance  Genitourinary: Negative  Negative for dysuria, frequency and urgency  Musculoskeletal: Negative  Negative for myalgias and neck pain  Skin: Negative  Negative for rash  Neurological: Negative  Negative for dizziness, tremors, seizures, syncope, facial asymmetry, speech difficulty, weakness, light-headedness, numbness and headaches  Hematological: Negative  Does not bruise/bleed easily  Psychiatric/Behavioral: Positive for confusion  Negative for hallucinations and sleep disturbance

## 2018-10-28 DIAGNOSIS — M19.90 OSTEOARTHRITIS, UNSPECIFIED OSTEOARTHRITIS TYPE, UNSPECIFIED SITE: ICD-10-CM

## 2018-10-28 RX ORDER — MELOXICAM 15 MG/1
TABLET ORAL
Qty: 30 TABLET | Refills: 3 | Status: SHIPPED | OUTPATIENT
Start: 2018-10-28 | End: 2019-03-10 | Stop reason: SDUPTHER

## 2018-11-19 ENCOUNTER — OFFICE VISIT (OUTPATIENT)
Dept: FAMILY MEDICINE CLINIC | Facility: CLINIC | Age: 78
End: 2018-11-19
Payer: MEDICARE

## 2018-11-19 VITALS
SYSTOLIC BLOOD PRESSURE: 168 MMHG | DIASTOLIC BLOOD PRESSURE: 86 MMHG | WEIGHT: 145 LBS | TEMPERATURE: 98.1 F | HEIGHT: 63 IN | BODY MASS INDEX: 25.69 KG/M2 | HEART RATE: 68 BPM

## 2018-11-19 DIAGNOSIS — I25.810 CORONARY ARTERY DISEASE INVOLVING CORONARY BYPASS GRAFT OF NATIVE HEART WITHOUT ANGINA PECTORIS: ICD-10-CM

## 2018-11-19 DIAGNOSIS — T78.3XXA ANGIOEDEMA, INITIAL ENCOUNTER: Primary | ICD-10-CM

## 2018-11-19 DIAGNOSIS — I63.50 CEREBROVASCULAR ACCIDENT (CVA) DUE TO OCCLUSION OF CEREBRAL ARTERY (HCC): ICD-10-CM

## 2018-11-19 DIAGNOSIS — I10 ESSENTIAL HYPERTENSION: ICD-10-CM

## 2018-11-19 PROCEDURE — 96372 THER/PROPH/DIAG INJ SC/IM: CPT | Performed by: FAMILY MEDICINE

## 2018-11-19 PROCEDURE — 99214 OFFICE O/P EST MOD 30 MIN: CPT | Performed by: FAMILY MEDICINE

## 2018-11-19 RX ORDER — DIPHENHYDRAMINE HYDROCHLORIDE 50 MG/ML
50 INJECTION INTRAMUSCULAR; INTRAVENOUS ONCE
Status: COMPLETED | OUTPATIENT
Start: 2018-11-19 | End: 2018-11-19

## 2018-11-19 RX ADMIN — Medication 10 MG: at 15:00

## 2018-11-19 RX ADMIN — DIPHENHYDRAMINE HYDROCHLORIDE 50 MG: 50 INJECTION INTRAMUSCULAR; INTRAVENOUS at 15:01

## 2018-11-19 NOTE — ASSESSMENT & PLAN NOTE
Patient does have enalapril for blood pressure  Would recommend that he discontinue the Vasotec  This can cause angioedema, despite the fact that he has been on it for a while  He denies any other symptoms of angioedema, but at this time I have to be concerned that that would be the worst case scenario  Would recommend Benadryl injection, as well as dexamethasone injection  Recommend that he proceed directly home after the Benadryl injection as it may make him somewhat sedated  He informs me that he lives approximately 3 minutes away which would be reasonable  Will follow up in approximately 2-3 days  If he has any problems at all, he was advised to call 911 and be transported to the hospital by ambulance

## 2018-11-19 NOTE — PROGRESS NOTES
Assessment/Plan:    Angioedema  Patient does have enalapril for blood pressure  Would recommend that he discontinue the Vasotec  This can cause angioedema, despite the fact that he has been on it for a while  He denies any other symptoms of angioedema, but at this time I have to be concerned that that would be the worst case scenario  Would recommend Benadryl injection, as well as dexamethasone injection  Recommend that he proceed directly home after the Benadryl injection as it may make him somewhat sedated  He informs me that he lives approximately 3 minutes away which would be reasonable  Will follow up in approximately 2-3 days  If he has any problems at all, he was advised to call 911 and be transported to the hospital by ambulance  CAD (coronary artery disease)  Stable at the moment  No current cardiac symptoms  No other changes  CVA (cerebral vascular accident) ([facility] )  Stable  No changes  Follow-up in the future  Hypertension  Blood pressure is elevated today, but with his current changes, I would elect not to make any adjustments  Follow-up in the future  Diagnoses and all orders for this visit:    Angioedema, initial encounter  -     dexamethasone (DECADRON) injection 10 mg; Inject 1 mL (10 mg total) into a muscle once   -     diphenhydrAMINE (BENADRYL) injection 50 mg; Inject 1 mL (50 mg total) into a muscle once     Coronary artery disease involving coronary bypass graft of native heart without angina pectoris    Essential hypertension    Cerebrovascular accident (CVA) due to occlusion of cerebral artery (HCC)          Subjective:   C/o approx 2 hours ago right cheek started to swell  mjs     Patient ID: Tanesha Poe is a [de-identified] y o  male  Patient noted that approximately 2 hr ago he had increased swelling on the right side of his face  It is on the lower portion of the jaw, but definitely present on the right side  No pain with it, just that it was there    Denies any new foods, no new medications, no new soaps clothing or detergents either  The visit he had was turkey, smoked, sandwich  Reviewed medication list   He does have an Ace inhibitor, Vasotec  Denies any other problems  No itching anywhere  No shortness of breath  No problem swallowing  He did mention that he took his meloxicam at noon, but this is no different than what he has been doing for at least the last month  The following portions of the patient's history were reviewed and updated as appropriate: allergies, current medications, past family history, past medical history, past social history, past surgical history and problem list     Review of Systems   Constitutional: Negative  HENT:        Per HPI   Eyes: Negative  Respiratory: Negative  Negative for shortness of breath and stridor  Cardiovascular: Negative  Gastrointestinal: Negative  All other systems reviewed and are negative  Objective:      /86   Pulse 68   Temp 98 1 °F (36 7 °C)   Ht 5' 3" (1 6 m)   Wt 65 8 kg (145 lb)   BMI 25 69 kg/m²          Physical Exam   Constitutional: He appears well-developed and well-nourished  HENT:   Head: Normocephalic and atraumatic  Neck: Normal range of motion  Neck supple  Cardiovascular: Normal rate, regular rhythm and normal heart sounds  Exam reveals no gallop and no friction rub  No murmur heard  Pulses:       Carotid pulses are 2+ on the right side, and 2+ on the left side  Pulmonary/Chest: Effort normal and breath sounds normal  No respiratory distress  He has no wheezes  He has no rales  Lymphadenopathy:        Head (right side): No submental, no submandibular, no tonsillar, no preauricular, no posterior auricular and no occipital adenopathy present  Head (left side): No submental, no submandibular, no tonsillar, no preauricular, no posterior auricular and no occipital adenopathy present  He has no cervical adenopathy     Nursing note and vitals reviewed

## 2018-11-19 NOTE — PATIENT INSTRUCTIONS
Problem List Items Addressed This Visit        Cardiovascular and Mediastinum    Hypertension     Blood pressure is elevated today, but with his current changes, I would elect not to make any adjustments  Follow-up in the future  CVA (cerebral vascular accident) ([address] )     Stable  No changes  Follow-up in the future  CAD (coronary artery disease)     Stable at the moment  No current cardiac symptoms  No other changes  Other    Angioedema - Primary     Patient does have enalapril for blood pressure  Would recommend that he discontinue the Vasotec  This can cause angioedema, despite the fact that he has been on it for a while  He denies any other symptoms of angioedema, but at this time I have to be concerned that that would be the worst case scenario  Would recommend Benadryl injection, as well as dexamethasone injection  Recommend that he proceed directly home after the Benadryl injection as it may make him somewhat sedated  He informs me that he lives approximately 3 minutes away which would be reasonable  Will follow up in approximately 2-3 days  If he has any problems at all, he was advised to call 911 and be transported to the hospital by ambulance           Relevant Medications    dexamethasone (DECADRON) injection 10 mg (Start on 11/19/2018  3:00 PM)    diphenhydrAMINE (BENADRYL) injection 50 mg (Start on 11/19/2018  3:00 PM)

## 2018-11-19 NOTE — ASSESSMENT & PLAN NOTE
Blood pressure is elevated today, but with his current changes, I would elect not to make any adjustments  Follow-up in the future

## 2018-11-21 ENCOUNTER — OFFICE VISIT (OUTPATIENT)
Dept: FAMILY MEDICINE CLINIC | Facility: CLINIC | Age: 78
End: 2018-11-21
Payer: MEDICARE

## 2018-11-21 VITALS
DIASTOLIC BLOOD PRESSURE: 74 MMHG | HEIGHT: 63 IN | HEART RATE: 64 BPM | BODY MASS INDEX: 25.45 KG/M2 | SYSTOLIC BLOOD PRESSURE: 118 MMHG | WEIGHT: 143.6 LBS

## 2018-11-21 DIAGNOSIS — I63.9 CEREBROVASCULAR ACCIDENT (CVA), UNSPECIFIED MECHANISM (HCC): ICD-10-CM

## 2018-11-21 DIAGNOSIS — I63.9 THALAMIC STROKE (HCC): ICD-10-CM

## 2018-11-21 DIAGNOSIS — T78.3XXD ANGIOEDEMA, SUBSEQUENT ENCOUNTER: Primary | ICD-10-CM

## 2018-11-21 PROCEDURE — 99213 OFFICE O/P EST LOW 20 MIN: CPT | Performed by: FAMILY MEDICINE

## 2018-11-21 RX ORDER — CLOPIDOGREL BISULFATE 75 MG/1
75 TABLET ORAL DAILY
Qty: 90 TABLET | Refills: 3 | Status: SHIPPED | OUTPATIENT
Start: 2018-11-21 | End: 2020-02-10

## 2018-11-21 RX ORDER — LORATADINE 10 MG/1
10 TABLET ORAL DAILY
Qty: 30 TABLET | Refills: 0
Start: 2018-11-21 | End: 2021-08-17 | Stop reason: ALTCHOICE

## 2018-11-21 NOTE — PROGRESS NOTES
Assessment/Plan:    Angioedema  Patient did get a injection of Benadryl and dexamethasone at his last office visit  Unfortunately, he does have some bruising from that  At this point, I would recommend that he start Claritin 10 mg daily, and consider ENT follow-up  This area has not completely resolved, and if it does not after the Monday of next week, he should follow with the ear nose and throat office  Diagnoses and all orders for this visit:    Angioedema, subsequent encounter  -     Ambulatory Referral to Otolaryngology; Future  -     loratadine (CLARITIN) 10 mg tablet; Take 1 tablet (10 mg total) by mouth daily for 30 days          Subjective: Follow up re: Facial swelling  Pt is feeling better but the swelling is still present  Also pt does not have to see Neuro anymore and wants to know if you will take over giving RX for Plavixkw     Patient ID: Jack Moore is a [de-identified] y o  male  Patient does report that the symptoms are better with regard to the swelling on the right side of the face  He did mention that he has a bag of karl lettuce at home, this was recently recalled  Unfortunately, his bag of lettuce was also 1 that was subject to the recall  The following portions of the patient's history were reviewed and updated as appropriate: allergies, current medications and problem list     Review of Systems   Constitutional: Negative  HENT: Negative  Eyes: Negative  Objective:      /74 (BP Location: Left arm)   Pulse 64   Ht 5' 3" (1 6 m)   Wt 65 1 kg (143 lb 9 6 oz)   BMI 25 44 kg/m²          Physical Exam   Constitutional: He appears well-developed and well-nourished  HENT:   Head:       Nursing note and vitals reviewed

## 2018-11-21 NOTE — PATIENT INSTRUCTIONS
Problem List Items Addressed This Visit        Other    Angioedema - Primary     Patient did get a injection of Benadryl and dexamethasone at his last office visit  Unfortunately, he does have some bruising from that  At this point, I would recommend that he start Claritin 10 mg daily, and consider ENT follow-up  This area has not completely resolved, and if it does not after the Monday of next week, he should follow with the ear nose and throat office           Relevant Medications    loratadine (CLARITIN) 10 mg tablet    Other Relevant Orders    Ambulatory Referral to Otolaryngology

## 2018-11-21 NOTE — ASSESSMENT & PLAN NOTE
Patient did get a injection of Benadryl and dexamethasone at his last office visit  Unfortunately, he does have some bruising from that  At this point, I would recommend that he start Claritin 10 mg daily, and consider ENT follow-up  This area has not completely resolved, and if it does not after the Monday of next week, he should follow with the ear nose and throat office

## 2019-01-18 ENCOUNTER — OFFICE VISIT (OUTPATIENT)
Dept: FAMILY MEDICINE CLINIC | Facility: CLINIC | Age: 79
End: 2019-01-18
Payer: MEDICARE

## 2019-01-18 VITALS
WEIGHT: 147.4 LBS | RESPIRATION RATE: 16 BRPM | DIASTOLIC BLOOD PRESSURE: 72 MMHG | BODY MASS INDEX: 26.12 KG/M2 | HEIGHT: 63 IN | SYSTOLIC BLOOD PRESSURE: 118 MMHG | HEART RATE: 64 BPM

## 2019-01-18 DIAGNOSIS — I10 ESSENTIAL HYPERTENSION: ICD-10-CM

## 2019-01-18 DIAGNOSIS — E78.2 MIXED HYPERLIPIDEMIA: Primary | ICD-10-CM

## 2019-01-18 DIAGNOSIS — T78.3XXD ANGIOEDEMA, SUBSEQUENT ENCOUNTER: ICD-10-CM

## 2019-01-18 DIAGNOSIS — I65.23 BILATERAL CAROTID ARTERY STENOSIS: ICD-10-CM

## 2019-01-18 DIAGNOSIS — I70.219 ATHEROSCLEROSIS OF NATIVE ARTERY OF LOWER EXTREMITY WITH INTERMITTENT CLAUDICATION, UNSPECIFIED LATERALITY (HCC): ICD-10-CM

## 2019-01-18 DIAGNOSIS — I63.50 CEREBROVASCULAR ACCIDENT (CVA) DUE TO OCCLUSION OF CEREBRAL ARTERY (HCC): ICD-10-CM

## 2019-01-18 PROBLEM — T78.3XXA ANGIOEDEMA: Status: RESOLVED | Noted: 2018-11-19 | Resolved: 2019-01-18

## 2019-01-18 PROCEDURE — 99214 OFFICE O/P EST MOD 30 MIN: CPT | Performed by: FAMILY MEDICINE

## 2019-01-18 NOTE — ASSESSMENT & PLAN NOTE
Patient currently has no signs of angioedema  Doing extremely well  He is off Ace inhibitors, and blood pressure is also doing well  No changes  I will resolve this diagnosis

## 2019-01-18 NOTE — ASSESSMENT & PLAN NOTE
Minimal changes at the moment  He still does have some visual changes, and will be following with Ophthalmology

## 2019-01-18 NOTE — ASSESSMENT & PLAN NOTE
Stable  No changes  He does have follow-up planned with vascular surgery at [facility]  No particular concerns at the moment  Patient is not having any other symptoms  Follow-up in the future as needed

## 2019-01-18 NOTE — PROGRESS NOTES
Assessment/Plan:    Angioedema  Patient currently has no signs of angioedema  Doing extremely well  He is off Ace inhibitors, and blood pressure is also doing well  No changes  I will resolve this diagnosis  Atherosclerosis of native artery of extremity with intermittent claudication [facility])  Patient is following with vascular  At this point, they recommended a repeat evaluation in April of 2020  Defer to vascular for orders and treatment recommendations  Bilateral carotid artery disease (HCC)  Stable  No changes  He does have follow-up planned with vascular surgery at [facility]  No particular concerns at the moment  Patient is not having any other symptoms  Follow-up in the future as needed  CVA (cerebral vascular accident) ([facility] [address] )  Minimal changes at the moment  He still does have some visual changes, and will be following with Ophthalmology  Hyperlipidemia  Cholesterol was recently done in October  He is due again in April  Ordered today for April  HDL was excellent  He is on low-dose statin secondary to secondary prevention, and I would not make adjustments in it  Hypertension  Blood pressure is excellent  Continue with current medications, Norvasc, Tenormin, hydrochlorothiazide  Diagnoses and all orders for this visit:    Mixed hyperlipidemia  -     Comprehensive metabolic panel; Future  -     Cholesterol, total; Future  -     HDL cholesterol; Future  -     LDL cholesterol, direct; Future    Essential hypertension    Cerebrovascular accident (CVA) due to occlusion of cerebral artery (HCC)  -     Comprehensive metabolic panel; Future  -     Cholesterol, total; Future  -     HDL cholesterol; Future  -     LDL cholesterol, direct; Future    Bilateral carotid artery stenosis    Atherosclerosis of native artery of lower extremity with intermittent claudication, unspecified laterality (HCC)    Angioedema, subsequent encounter          Subjective: Pt here for a follow up on CVA   TOM Cooper Patient ID: Ciaran Wells is a [de-identified] y o  male  Here for post CVA follow up  He has seen Neurology  They have him on Plavix and statins for secondary prevention  He did have an episode of angioedema  Since the DC of ACE, he is doing well  He has appointment to go to Ophtho  His regular doctor did not find vision changes  He did have some visual field changes, and was recommended to go to glaucoma specialist  Has appoint with Dr Villa Tamez  Patient does have some minor carotid stenosis  He has been seen vascular  His last visit there was April of 2018  At that point, they recommended a 2 year follow-up, which would be in 2020  Patient does have some aortoiliac disease, and again 2 year repeat meaning 20/20 would be reasonable for him  He did mention that he is using magnesium for cramping  Patient is on statins for secondary prevention for CVA  He does have slightly high cholesterol from before  His last was in October  No current myalgias, memory loss, or other issues secondary to statins  Last HDL was 72          The following portions of the patient's history were reviewed and updated as appropriate: allergies, current medications, past family history, past medical history, past social history, past surgical history and problem list     Review of Systems      Objective:    Vitals:    01/18/19 1108   BP: 118/72   BP Location: Left arm   Patient Position: Sitting   Cuff Size: Standard   Pulse: 64   Resp: 16   Weight: 66 9 kg (147 lb 6 4 oz)   Height: 5' 3" (1 6 m)            Physical Exam

## 2019-01-18 NOTE — ASSESSMENT & PLAN NOTE
Patient is following with vascular  At this point, they recommended a repeat evaluation in April of 2020  Defer to vascular for orders and treatment recommendations

## 2019-01-18 NOTE — ASSESSMENT & PLAN NOTE
Cholesterol was recently done in October  He is due again in April  Ordered today for April  HDL was excellent  He is on low-dose statin secondary to secondary prevention, and I would not make adjustments in it

## 2019-01-18 NOTE — ASSESSMENT & PLAN NOTE
Blood pressure is excellent  Continue with current medications, Norvasc, Tenormin, hydrochlorothiazide

## 2019-01-18 NOTE — PATIENT INSTRUCTIONS
Problem List Items Addressed This Visit     Hypertension     Blood pressure is excellent  Continue with current medications, Norvasc, Tenormin, hydrochlorothiazide  Hyperlipidemia - Primary     Cholesterol was recently done in October  He is due again in April  Ordered today for April  HDL was excellent  He is on low-dose statin secondary to secondary prevention, and I would not make adjustments in it  Relevant Orders    Comprehensive metabolic panel    Cholesterol, total    HDL cholesterol    LDL cholesterol, direct    Atherosclerosis of native artery of extremity with intermittent claudication [facility])     Patient is following with vascular  At this point, they recommended a repeat evaluation in April of 2020  Defer to vascular for orders and treatment recommendations  Bilateral carotid artery disease (HCC)     Stable  No changes  He does have follow-up planned with vascular surgery at [facility]  No particular concerns at the moment  Patient is not having any other symptoms  Follow-up in the future as needed  CVA (cerebral vascular accident) ([facility] [address] )     Minimal changes at the moment  He still does have some visual changes, and will be following with Ophthalmology  Relevant Orders    Comprehensive metabolic panel    Cholesterol, total    HDL cholesterol    LDL cholesterol, direct    RESOLVED: Angioedema     Patient currently has no signs of angioedema  Doing extremely well  He is off Ace inhibitors, and blood pressure is also doing well  No changes  I will resolve this diagnosis

## 2019-01-27 DIAGNOSIS — I10 ESSENTIAL HYPERTENSION: ICD-10-CM

## 2019-01-28 RX ORDER — HYDROCHLOROTHIAZIDE 25 MG/1
TABLET ORAL
Qty: 90 TABLET | Refills: 1 | Status: SHIPPED | OUTPATIENT
Start: 2019-01-28 | End: 2020-01-19

## 2019-02-03 DIAGNOSIS — K21.9 GASTROESOPHAGEAL REFLUX DISEASE, ESOPHAGITIS PRESENCE NOT SPECIFIED: ICD-10-CM

## 2019-02-05 RX ORDER — OMEPRAZOLE 20 MG/1
CAPSULE, DELAYED RELEASE ORAL
Qty: 90 CAPSULE | Refills: 0 | Status: SHIPPED | OUTPATIENT
Start: 2019-02-05 | End: 2019-09-08 | Stop reason: SDUPTHER

## 2019-03-10 DIAGNOSIS — M19.90 OSTEOARTHRITIS, UNSPECIFIED OSTEOARTHRITIS TYPE, UNSPECIFIED SITE: ICD-10-CM

## 2019-03-10 RX ORDER — MELOXICAM 15 MG/1
TABLET ORAL
Qty: 30 TABLET | Refills: 3 | Status: SHIPPED | OUTPATIENT
Start: 2019-03-10 | End: 2019-07-10 | Stop reason: SDUPTHER

## 2019-04-18 ENCOUNTER — APPOINTMENT (OUTPATIENT)
Dept: LAB | Facility: CLINIC | Age: 79
End: 2019-04-18
Payer: MEDICARE

## 2019-04-18 DIAGNOSIS — E78.2 MIXED HYPERLIPIDEMIA: ICD-10-CM

## 2019-04-18 DIAGNOSIS — I63.50 CEREBROVASCULAR ACCIDENT (CVA) DUE TO OCCLUSION OF CEREBRAL ARTERY (HCC): ICD-10-CM

## 2019-04-18 LAB
ALBUMIN SERPL BCP-MCNC: 4.1 G/DL (ref 3.5–5)
ALP SERPL-CCNC: 81 U/L (ref 46–116)
ALT SERPL W P-5'-P-CCNC: 27 U/L (ref 12–78)
ANION GAP SERPL CALCULATED.3IONS-SCNC: 6 MMOL/L (ref 4–13)
AST SERPL W P-5'-P-CCNC: 19 U/L (ref 5–45)
BILIRUB SERPL-MCNC: 0.69 MG/DL (ref 0.2–1)
BUN SERPL-MCNC: 16 MG/DL (ref 5–25)
CALCIUM SERPL-MCNC: 9 MG/DL (ref 8.3–10.1)
CHLORIDE SERPL-SCNC: 99 MMOL/L (ref 100–108)
CHOLEST SERPL-MCNC: 231 MG/DL (ref 50–200)
CO2 SERPL-SCNC: 29 MMOL/L (ref 21–32)
CREAT SERPL-MCNC: 1.1 MG/DL (ref 0.6–1.3)
GFR SERPL CREATININE-BSD FRML MDRD: 63 ML/MIN/1.73SQ M
GLUCOSE P FAST SERPL-MCNC: 94 MG/DL (ref 65–99)
HDLC SERPL-MCNC: 76 MG/DL (ref 40–60)
LDLC SERPL DIRECT ASSAY-MCNC: 132 MG/DL (ref 0–100)
POTASSIUM SERPL-SCNC: 4 MMOL/L (ref 3.5–5.3)
PROT SERPL-MCNC: 7.4 G/DL (ref 6.4–8.2)
SODIUM SERPL-SCNC: 134 MMOL/L (ref 136–145)

## 2019-04-18 PROCEDURE — 36415 COLL VENOUS BLD VENIPUNCTURE: CPT

## 2019-04-18 PROCEDURE — 83721 ASSAY OF BLOOD LIPOPROTEIN: CPT

## 2019-04-18 PROCEDURE — 80053 COMPREHEN METABOLIC PANEL: CPT

## 2019-04-18 PROCEDURE — 83718 ASSAY OF LIPOPROTEIN: CPT

## 2019-04-18 PROCEDURE — 82465 ASSAY BLD/SERUM CHOLESTEROL: CPT

## 2019-04-28 DIAGNOSIS — I10 ESSENTIAL HYPERTENSION: ICD-10-CM

## 2019-04-29 ENCOUNTER — OFFICE VISIT (OUTPATIENT)
Dept: FAMILY MEDICINE CLINIC | Facility: CLINIC | Age: 79
End: 2019-04-29
Payer: MEDICARE

## 2019-04-29 VITALS
HEIGHT: 63 IN | BODY MASS INDEX: 26.05 KG/M2 | HEART RATE: 68 BPM | DIASTOLIC BLOOD PRESSURE: 78 MMHG | SYSTOLIC BLOOD PRESSURE: 130 MMHG | WEIGHT: 147 LBS | RESPIRATION RATE: 16 BRPM

## 2019-04-29 DIAGNOSIS — E78.2 MIXED HYPERLIPIDEMIA: ICD-10-CM

## 2019-04-29 DIAGNOSIS — I25.810 CORONARY ARTERY DISEASE INVOLVING CORONARY BYPASS GRAFT OF NATIVE HEART WITHOUT ANGINA PECTORIS: Primary | ICD-10-CM

## 2019-04-29 DIAGNOSIS — I10 ESSENTIAL HYPERTENSION: ICD-10-CM

## 2019-04-29 DIAGNOSIS — E87.1 HYPONATREMIA: ICD-10-CM

## 2019-04-29 DIAGNOSIS — I70.219 ATHEROSCLEROSIS OF NATIVE ARTERY OF LOWER EXTREMITY WITH INTERMITTENT CLAUDICATION, UNSPECIFIED LATERALITY (HCC): ICD-10-CM

## 2019-04-29 DIAGNOSIS — I63.50 CEREBROVASCULAR ACCIDENT (CVA) DUE TO OCCLUSION OF CEREBRAL ARTERY (HCC): ICD-10-CM

## 2019-04-29 PROCEDURE — 99214 OFFICE O/P EST MOD 30 MIN: CPT | Performed by: FAMILY MEDICINE

## 2019-04-29 RX ORDER — ATENOLOL 25 MG/1
TABLET ORAL
Qty: 45 TABLET | Refills: 3 | Status: SHIPPED | OUTPATIENT
Start: 2019-04-29 | End: 2020-04-19

## 2019-07-10 DIAGNOSIS — M19.90 OSTEOARTHRITIS, UNSPECIFIED OSTEOARTHRITIS TYPE, UNSPECIFIED SITE: ICD-10-CM

## 2019-07-10 RX ORDER — MELOXICAM 15 MG/1
TABLET ORAL
Qty: 30 TABLET | Refills: 3 | Status: SHIPPED | OUTPATIENT
Start: 2019-07-10 | End: 2019-11-10 | Stop reason: SDUPTHER

## 2019-07-14 DIAGNOSIS — I10 ESSENTIAL HYPERTENSION: ICD-10-CM

## 2019-07-15 RX ORDER — AMLODIPINE BESYLATE 10 MG/1
TABLET ORAL
Qty: 90 TABLET | Refills: 3 | Status: SHIPPED | OUTPATIENT
Start: 2019-07-15 | End: 2020-07-06

## 2019-09-08 DIAGNOSIS — K21.9 GASTROESOPHAGEAL REFLUX DISEASE, ESOPHAGITIS PRESENCE NOT SPECIFIED: ICD-10-CM

## 2019-09-09 RX ORDER — OMEPRAZOLE 20 MG/1
CAPSULE, DELAYED RELEASE ORAL
Qty: 90 CAPSULE | Refills: 0 | Status: SHIPPED | OUTPATIENT
Start: 2019-09-09 | End: 2020-04-09

## 2019-10-17 ENCOUNTER — OFFICE VISIT (OUTPATIENT)
Dept: FAMILY MEDICINE CLINIC | Facility: CLINIC | Age: 79
End: 2019-10-17
Payer: MEDICARE

## 2019-10-17 VITALS
WEIGHT: 145 LBS | RESPIRATION RATE: 16 BRPM | BODY MASS INDEX: 25.69 KG/M2 | HEART RATE: 64 BPM | HEIGHT: 63 IN | SYSTOLIC BLOOD PRESSURE: 160 MMHG | TEMPERATURE: 97.8 F | DIASTOLIC BLOOD PRESSURE: 90 MMHG

## 2019-10-17 DIAGNOSIS — E78.2 MIXED HYPERLIPIDEMIA: ICD-10-CM

## 2019-10-17 DIAGNOSIS — I25.810 CORONARY ARTERY DISEASE INVOLVING CORONARY BYPASS GRAFT OF NATIVE HEART WITHOUT ANGINA PECTORIS: ICD-10-CM

## 2019-10-17 DIAGNOSIS — L57.0 ACTINIC KERATOSIS: Primary | ICD-10-CM

## 2019-10-17 DIAGNOSIS — I10 ESSENTIAL HYPERTENSION: ICD-10-CM

## 2019-10-17 DIAGNOSIS — Z23 ENCOUNTER FOR IMMUNIZATION: ICD-10-CM

## 2019-10-17 PROCEDURE — 99214 OFFICE O/P EST MOD 30 MIN: CPT | Performed by: FAMILY MEDICINE

## 2019-10-17 PROCEDURE — G0008 ADMIN INFLUENZA VIRUS VAC: HCPCS | Performed by: FAMILY MEDICINE

## 2019-10-17 PROCEDURE — 90662 IIV NO PRSV INCREASED AG IM: CPT | Performed by: FAMILY MEDICINE

## 2019-10-17 NOTE — PROGRESS NOTES
Assessment and Plan:    Problem List Items Addressed This Visit     Actinic keratosis     Multiple areas on the face  Would recommend follow with Dermatology as there is also a nevus that appears to be somewhat changing the right eyebrow  CAD (coronary artery disease)     Stable for now  No current symptoms  Follow-up as scheduled  Hyperlipidemia     Stable with cholesterol  Continue on the Lipitor  No changes  Hypertension     Blood pressure today on recheck was slightly better, but still not at goal   Due to the last blood pressure that he was on being reasonable, I would not make adjustment today  He already has an appointment for follow-up set up, we will adjust then if necessary  Other Visit Diagnoses     Encounter for immunization    -  Primary    Relevant Orders    influenza vaccine, [id number], high-dose, PF 0 5 mL (FLUZONE HIGH-DOSE) (Completed)                 Diagnoses and all orders for this visit:    Encounter for immunization  -     influenza vaccine, [id number], high-dose, PF 0 5 mL (FLUZONE HIGH-DOSE)    Actinic keratosis    Essential hypertension    Mixed hyperlipidemia    Coronary artery disease involving coronary bypass graft of native heart without angina pectoris              Subjective:      Patient ID: Shelley Larios is a 78 y o  male  CC:    Chief Complaint   Patient presents with    Eczema     Patient panda today for eczema on his hairline and eyebrows for the last 5 years  Per patient it is getting worse  HPI:    Patient has what he thinks may be eczema on the right eyebrow  He has some on the scalp as well  It is also on the area of the side burns  Has noted it for quite a while now  He has not tried medication for it before  He wondered what it was in particular  The areas are not itchy  They are somewhat scaly  This is been going on for years now  Nothing in particular seems to make it better    He is not sure if anything in particular makes it worse either  Hypertension:  Patient's blood pressure today is quite a bit elevated  He is on amlodipine, HCTZ and atenolol  Patient does have hyperlipidemia  He is currently on atorvastatin  Denies any problems with it  CV:  Stable  No concerns at the moment  The following portions of the patient's history were reviewed and updated as appropriate: allergies, current medications, past family history, past medical history, past social history, past surgical history and problem list       Review of Systems   HENT: Negative  Respiratory: Negative  Cardiovascular: Negative  Skin: Positive for rash  All other systems reviewed and are negative  Data to review:       Objective:    Vitals:    10/17/19 1337 10/17/19 1423   BP: (!) 172/96 160/90   BP Location: Left arm    Patient Position: Sitting    Cuff Size: Standard    Pulse: 64    Resp: 16    Temp: 97 8 °F (36 6 °C)    TempSrc: Temporal    Weight: 65 8 kg (145 lb)    Height: 5' 3" (1 6 m)         Physical Exam   Constitutional: He appears well-developed and well-nourished  HENT:   Head: Normocephalic and atraumatic  Neck: Normal range of motion  Neck supple  Cardiovascular: Normal rate, regular rhythm and normal heart sounds  Exam reveals no gallop and no friction rub  No murmur heard  Pulses:       Carotid pulses are 2+ on the right side, and 2+ on the left side  Pulmonary/Chest: Effort normal and breath sounds normal  No respiratory distress  He has no wheezes  He has no rales  Skin: Skin is warm and dry  Nursing note and vitals reviewed

## 2019-10-17 NOTE — ASSESSMENT & PLAN NOTE
Blood pressure today on recheck was slightly better, but still not at goal   Due to the last blood pressure that he was on being reasonable, I would not make adjustment today  He already has an appointment for follow-up set up, we will adjust then if necessary

## 2019-10-17 NOTE — PATIENT INSTRUCTIONS
Problem List Items Addressed This Visit     Actinic keratosis - Primary     Multiple areas on the face  Would recommend follow with Dermatology as there is also a nevus that appears to be somewhat changing the right eyebrow  Relevant Orders    Ambulatory referral to Dermatology    CAD (coronary artery disease)     Stable for now  No current symptoms  Follow-up as scheduled  Hyperlipidemia     Stable with cholesterol  Continue on the Lipitor  No changes  Hypertension     Blood pressure today on recheck was slightly better, but still not at goal   Due to the last blood pressure that he was on being reasonable, I would not make adjustment today  He already has an appointment for follow-up set up, we will adjust then if necessary             Other Visit Diagnoses     Encounter for immunization        Relevant Orders    influenza vaccine, [id number], high-dose, PF 0 5 mL (FLUZONE HIGH-DOSE) (Completed)

## 2019-10-17 NOTE — ASSESSMENT & PLAN NOTE
Multiple areas on the face  Would recommend follow with Dermatology as there is also a nevus that appears to be somewhat changing the right eyebrow

## 2019-10-20 DIAGNOSIS — E78.00 PURE HYPERCHOLESTEROLEMIA: ICD-10-CM

## 2019-10-20 RX ORDER — ATORVASTATIN CALCIUM 10 MG/1
TABLET, FILM COATED ORAL
Qty: 90 TABLET | Refills: 3 | Status: SHIPPED | OUTPATIENT
Start: 2019-10-20 | End: 2020-10-12

## 2019-10-29 ENCOUNTER — APPOINTMENT (OUTPATIENT)
Dept: LAB | Facility: CLINIC | Age: 79
End: 2019-10-29
Payer: MEDICARE

## 2019-10-29 DIAGNOSIS — I10 ESSENTIAL HYPERTENSION: ICD-10-CM

## 2019-10-29 DIAGNOSIS — E78.2 MIXED HYPERLIPIDEMIA: ICD-10-CM

## 2019-10-29 DIAGNOSIS — I25.810 CORONARY ARTERY DISEASE INVOLVING CORONARY BYPASS GRAFT OF NATIVE HEART WITHOUT ANGINA PECTORIS: ICD-10-CM

## 2019-10-29 LAB
ALBUMIN SERPL BCP-MCNC: 4.5 G/DL (ref 3.5–5)
ALP SERPL-CCNC: 89 U/L (ref 46–116)
ALT SERPL W P-5'-P-CCNC: 27 U/L (ref 12–78)
ANION GAP SERPL CALCULATED.3IONS-SCNC: 7 MMOL/L (ref 4–13)
AST SERPL W P-5'-P-CCNC: 23 U/L (ref 5–45)
BILIRUB SERPL-MCNC: 0.78 MG/DL (ref 0.2–1)
BUN SERPL-MCNC: 18 MG/DL (ref 5–25)
CALCIUM SERPL-MCNC: 9.3 MG/DL (ref 8.3–10.1)
CHLORIDE SERPL-SCNC: 100 MMOL/L (ref 100–108)
CHOLEST SERPL-MCNC: 235 MG/DL (ref 50–200)
CO2 SERPL-SCNC: 28 MMOL/L (ref 21–32)
CREAT SERPL-MCNC: 1.16 MG/DL (ref 0.6–1.3)
GFR SERPL CREATININE-BSD FRML MDRD: 59 ML/MIN/1.73SQ M
GLUCOSE P FAST SERPL-MCNC: 99 MG/DL (ref 65–99)
HDLC SERPL-MCNC: 72 MG/DL
LDLC SERPL CALC-MCNC: 142 MG/DL (ref 0–100)
NONHDLC SERPL-MCNC: 163 MG/DL
POTASSIUM SERPL-SCNC: 4.4 MMOL/L (ref 3.5–5.3)
PROT SERPL-MCNC: 7.5 G/DL (ref 6.4–8.2)
SODIUM SERPL-SCNC: 135 MMOL/L (ref 136–145)
TRIGL SERPL-MCNC: 107 MG/DL

## 2019-10-29 PROCEDURE — 80061 LIPID PANEL: CPT

## 2019-10-29 PROCEDURE — 36415 COLL VENOUS BLD VENIPUNCTURE: CPT

## 2019-10-29 PROCEDURE — 80053 COMPREHEN METABOLIC PANEL: CPT

## 2019-11-10 DIAGNOSIS — M19.90 OSTEOARTHRITIS, UNSPECIFIED OSTEOARTHRITIS TYPE, UNSPECIFIED SITE: ICD-10-CM

## 2019-11-10 RX ORDER — MELOXICAM 15 MG/1
TABLET ORAL
Qty: 30 TABLET | Refills: 3 | Status: SHIPPED | OUTPATIENT
Start: 2019-11-10 | End: 2020-03-10

## 2019-11-11 ENCOUNTER — OFFICE VISIT (OUTPATIENT)
Dept: FAMILY MEDICINE CLINIC | Facility: CLINIC | Age: 79
End: 2019-11-11
Payer: MEDICARE

## 2019-11-11 VITALS
HEART RATE: 70 BPM | TEMPERATURE: 97.9 F | WEIGHT: 146.6 LBS | DIASTOLIC BLOOD PRESSURE: 80 MMHG | SYSTOLIC BLOOD PRESSURE: 164 MMHG | RESPIRATION RATE: 18 BRPM | HEIGHT: 63 IN | BODY MASS INDEX: 25.98 KG/M2

## 2019-11-11 DIAGNOSIS — E78.2 MIXED HYPERLIPIDEMIA: ICD-10-CM

## 2019-11-11 DIAGNOSIS — I10 ESSENTIAL HYPERTENSION: ICD-10-CM

## 2019-11-11 DIAGNOSIS — Z23 NEED FOR VACCINATION AGAINST STREPTOCOCCUS PNEUMONIAE USING PNEUMOCOCCAL CONJUGATE VACCINE 13: ICD-10-CM

## 2019-11-11 DIAGNOSIS — I70.219 ATHEROSCLEROSIS OF NATIVE ARTERY OF LOWER EXTREMITY WITH INTERMITTENT CLAUDICATION, UNSPECIFIED LATERALITY (HCC): ICD-10-CM

## 2019-11-11 DIAGNOSIS — I25.810 CORONARY ARTERY DISEASE INVOLVING CORONARY BYPASS GRAFT OF NATIVE HEART WITHOUT ANGINA PECTORIS: Primary | ICD-10-CM

## 2019-11-11 DIAGNOSIS — E87.1 HYPONATREMIA: ICD-10-CM

## 2019-11-11 DIAGNOSIS — I63.50 CEREBROVASCULAR ACCIDENT (CVA) DUE TO OCCLUSION OF CEREBRAL ARTERY (HCC): ICD-10-CM

## 2019-11-11 PROCEDURE — 99214 OFFICE O/P EST MOD 30 MIN: CPT | Performed by: FAMILY MEDICINE

## 2019-11-11 NOTE — ASSESSMENT & PLAN NOTE
LDL cholesterol is still elevated  We did briefly discuss increasing statin therapy, but patient preferred to stay with just 10 mg Lipitor  He does understand the risks with this  Will recheck in 6 months without change

## 2019-11-11 NOTE — PATIENT INSTRUCTIONS
Problem List Items Addressed This Visit     Atherosclerosis of native artery of extremity with intermittent claudication ([facility] [address] )     Follow-up with vascular as scheduled  No changes  Patient is due in 2020  CAD (coronary artery disease) - Primary     Stable for now  No current symptoms  No changes  CVA (cerebral vascular accident) [facility])     Old CVA  No changes  This is just for notation as he does then have increased risk  Hyperlipidemia     LDL cholesterol is still elevated  We did briefly discuss increasing statin therapy, but patient preferred to stay with just 10 mg Lipitor  He does understand the risks with this  Will recheck in 6 months without change  Relevant Orders    Cholesterol, total    Comprehensive metabolic panel    HDL cholesterol    LDL cholesterol, direct    Hypertension     Blood pressure today is elevated again  Patient reported that he did have bradycardia with increased atenolol previously  Based on this, the fact that he is on maximal amlodipine and hydrochlorothiazide, as well as having angioedema with Ace inhibitors, means that he is pretty much maximized on his treatment  At this point, will not make any adjustments  Patient should try to limit sodium in his diet  He informs me he is already doing that  Relevant Orders    Comprehensive metabolic panel    Hyponatremia     Sodium normal   No changes  Relevant Orders    Comprehensive metabolic panel      Other Visit Diagnoses     Need for vaccination against Streptococcus pneumoniae using pneumococcal conjugate vaccine 13        In the records, there does not appear to be information on pneumococcal vaccine  I would recommend Pneumovax and Prevnar  Information will be given

## 2019-11-11 NOTE — ASSESSMENT & PLAN NOTE
Blood pressure today is elevated again  Patient reported that he did have bradycardia with increased atenolol previously  Based on this, the fact that he is on maximal amlodipine and hydrochlorothiazide, as well as having angioedema with Ace inhibitors, means that he is pretty much maximized on his treatment  At this point, will not make any adjustments  Patient should try to limit sodium in his diet  He informs me he is already doing that

## 2019-11-11 NOTE — PROGRESS NOTES
Assessment and Plan:    Problem List Items Addressed This Visit     Atherosclerosis of native artery of extremity with intermittent claudication ([facility] [address] )     Follow-up with vascular as scheduled  No changes  Patient is due in 2020  CAD (coronary artery disease) - Primary     Stable for now  No current symptoms  No changes  CVA (cerebral vascular accident) [facility])     Old CVA  No changes  This is just for notation as he does then have increased risk  Hyperlipidemia     LDL cholesterol is still elevated  We did briefly discuss increasing statin therapy, but patient preferred to stay with just 10 mg Lipitor  He does understand the risks with this  Will recheck in 6 months without change  Relevant Orders    Cholesterol, total    Comprehensive metabolic panel    HDL cholesterol    LDL cholesterol, direct    Hypertension     Blood pressure today is elevated again  Patient reported that he did have bradycardia with increased atenolol previously  Based on this, the fact that he is on maximal amlodipine and hydrochlorothiazide, as well as having angioedema with Ace inhibitors, means that he is pretty much maximized on his treatment  At this point, will not make any adjustments  Patient should try to limit sodium in his diet  He informs me he is already doing that  Relevant Orders    Comprehensive metabolic panel    Hyponatremia     Sodium normal   No changes  Relevant Orders    Comprehensive metabolic panel      Other Visit Diagnoses     Need for vaccination against Streptococcus pneumoniae using pneumococcal conjugate vaccine 13        In the records, there does not appear to be information on pneumococcal vaccine  I would recommend Pneumovax and Prevnar  Information will be given                   Diagnoses and all orders for this visit:    Coronary artery disease involving coronary bypass graft of native heart without angina pectoris    Essential hypertension  - Comprehensive metabolic panel; Future    Mixed hyperlipidemia  -     Cholesterol, total; Future  -     Comprehensive metabolic panel; Future  -     HDL cholesterol; Future  -     LDL cholesterol, direct; Future    Hyponatremia  -     Comprehensive metabolic panel; Future    Cerebrovascular accident (CVA) due to occlusion of cerebral artery (HCC)    Atherosclerosis of native artery of lower extremity with intermittent claudication, unspecified laterality ([facility] [address] )    Need for vaccination against Streptococcus pneumoniae using pneumococcal conjugate vaccine 13  Comments: In the records, there does not appear to be information on pneumococcal vaccine  I would recommend Pneumovax and Prevnar  Information will be given  Subjective:      Patient ID: Araceli Brumfield is a 78 y o  male  CC:    Chief Complaint   Patient presents with    Follow-up     Pt is here for a 6 month follow up and discuss bloodwork  HPI:    Patient is here to follow-up on multiple issues  With regard to cholesterol, is currently on Lipitor 10 mg  Did review with me today that he is not interested in increasing his dose of Lipitor  CAD:  Stable at the moment  No current symptoms  PAD:  He does continue to have some symptoms at times, but not any new symptoms versus before  Patient does follow with vascular  He has an appointment scheduled with them for 2020  Patient is also having issues with blood pressure, more specifically has a slightly elevated blood pressure today  Currently on hydrochlorothiazide, atenolol, and Norvasc  The following portions of the patient's history were reviewed and updated as appropriate: allergies, current medications, past family history, past medical history, past social history, past surgical history and problem list       Review of Systems   Constitutional: Negative  HENT: Negative  Eyes: Negative  Respiratory: Negative  Cardiovascular: Negative  Gastrointestinal: Negative  Endocrine: Negative  Genitourinary: Negative  Musculoskeletal: Negative  Skin: Negative  Allergic/Immunologic: Negative  Neurological: Negative  Hematological: Negative  Psychiatric/Behavioral: Negative  Data to review:   Sodium 135, potassium 4 4, calcium 9 3  Blood sugar 99  AST 23, ALT 27  Creatinine 1 16, GFR 59  Total cholesterol 235, , HDL 72, triglycerides 107  Objective:    Vitals:    11/11/19 1056 11/11/19 1130   BP: 162/82 164/80   BP Location: Left arm    Patient Position: Sitting    Cuff Size: Adult    Pulse: 70    Resp: 18    Temp: 97 9 °F (36 6 °C)    TempSrc: Oral    Weight: 66 5 kg (146 lb 9 6 oz)    Height: 5' 3" (1 6 m)         Physical Exam   Constitutional: He appears well-developed and well-nourished  HENT:   Head: Normocephalic and atraumatic  Neck: Normal range of motion  Neck supple  Cardiovascular: Normal rate, regular rhythm and normal heart sounds  Exam reveals no gallop and no friction rub  No murmur heard  Pulses:       Carotid pulses are 2+ on the right side, and 2+ on the left side  Pulmonary/Chest: Effort normal and breath sounds normal  No respiratory distress  He has no wheezes  He has no rales  Nursing note and vitals reviewed  BMI Counseling: Body mass index is 25 97 kg/m²  The BMI is above normal  Nutrition recommendations include reducing portion sizes, decreasing overall calorie intake, 3-5 servings of fruits/vegetables daily, reducing fast food intake, consuming healthier snacks, decreasing soda and/or juice intake, moderation in carbohydrate intake, increasing intake of lean protein, reducing intake of saturated fat and trans fat and reducing intake of cholesterol  Exercise recommendations include exercising 3-5 times per week

## 2020-01-19 DIAGNOSIS — I10 ESSENTIAL HYPERTENSION: ICD-10-CM

## 2020-01-19 RX ORDER — HYDROCHLOROTHIAZIDE 25 MG/1
TABLET ORAL
Qty: 90 TABLET | Refills: 0 | Status: SHIPPED | OUTPATIENT
Start: 2020-01-19 | End: 2020-07-20

## 2020-02-09 DIAGNOSIS — I63.9 THALAMIC STROKE (HCC): ICD-10-CM

## 2020-02-09 DIAGNOSIS — I63.9 CEREBROVASCULAR ACCIDENT (CVA), UNSPECIFIED MECHANISM (HCC): ICD-10-CM

## 2020-02-10 RX ORDER — CLOPIDOGREL BISULFATE 75 MG/1
TABLET ORAL
Qty: 90 TABLET | Refills: 3 | Status: SHIPPED | OUTPATIENT
Start: 2020-02-10 | End: 2021-02-01

## 2020-03-10 DIAGNOSIS — M19.90 OSTEOARTHRITIS, UNSPECIFIED OSTEOARTHRITIS TYPE, UNSPECIFIED SITE: ICD-10-CM

## 2020-03-10 RX ORDER — MELOXICAM 15 MG/1
TABLET ORAL
Qty: 30 TABLET | Refills: 3 | Status: SHIPPED | OUTPATIENT
Start: 2020-03-10 | End: 2020-07-12

## 2020-04-09 DIAGNOSIS — K21.9 GASTROESOPHAGEAL REFLUX DISEASE, ESOPHAGITIS PRESENCE NOT SPECIFIED: ICD-10-CM

## 2020-04-09 RX ORDER — OMEPRAZOLE 20 MG/1
CAPSULE, DELAYED RELEASE ORAL
Qty: 90 CAPSULE | Refills: 0 | Status: SHIPPED | OUTPATIENT
Start: 2020-04-09 | End: 2020-11-09

## 2020-04-19 DIAGNOSIS — I10 ESSENTIAL HYPERTENSION: ICD-10-CM

## 2020-04-19 RX ORDER — ATENOLOL 25 MG/1
TABLET ORAL
Qty: 45 TABLET | Refills: 3 | Status: SHIPPED | OUTPATIENT
Start: 2020-04-19 | End: 2021-04-16

## 2020-07-05 DIAGNOSIS — I10 ESSENTIAL HYPERTENSION: ICD-10-CM

## 2020-07-06 RX ORDER — AMLODIPINE BESYLATE 10 MG/1
TABLET ORAL
Qty: 90 TABLET | Refills: 3 | Status: SHIPPED | OUTPATIENT
Start: 2020-07-06 | End: 2021-06-16

## 2020-07-12 DIAGNOSIS — M19.90 OSTEOARTHRITIS, UNSPECIFIED OSTEOARTHRITIS TYPE, UNSPECIFIED SITE: ICD-10-CM

## 2020-07-12 RX ORDER — MELOXICAM 15 MG/1
TABLET ORAL
Qty: 30 TABLET | Refills: 3 | Status: SHIPPED | OUTPATIENT
Start: 2020-07-12 | End: 2020-11-09

## 2020-07-19 DIAGNOSIS — I10 ESSENTIAL HYPERTENSION: ICD-10-CM

## 2020-07-20 RX ORDER — HYDROCHLOROTHIAZIDE 25 MG/1
TABLET ORAL
Qty: 90 TABLET | Refills: 0 | Status: SHIPPED | OUTPATIENT
Start: 2020-07-20 | End: 2021-01-17

## 2020-07-24 ENCOUNTER — APPOINTMENT (OUTPATIENT)
Dept: LAB | Facility: CLINIC | Age: 80
End: 2020-07-24
Payer: MEDICARE

## 2020-07-24 DIAGNOSIS — I10 ESSENTIAL HYPERTENSION: ICD-10-CM

## 2020-07-24 DIAGNOSIS — E87.1 HYPONATREMIA: ICD-10-CM

## 2020-07-24 DIAGNOSIS — E78.2 MIXED HYPERLIPIDEMIA: ICD-10-CM

## 2020-07-24 LAB
ALBUMIN SERPL BCP-MCNC: 3.9 G/DL (ref 3.5–5)
ALP SERPL-CCNC: 84 U/L (ref 46–116)
ALT SERPL W P-5'-P-CCNC: 29 U/L (ref 12–78)
ANION GAP SERPL CALCULATED.3IONS-SCNC: 8 MMOL/L (ref 4–13)
AST SERPL W P-5'-P-CCNC: 21 U/L (ref 5–45)
BILIRUB SERPL-MCNC: 0.64 MG/DL (ref 0.2–1)
BUN SERPL-MCNC: 13 MG/DL (ref 5–25)
CALCIUM SERPL-MCNC: 10.1 MG/DL (ref 8.3–10.1)
CHLORIDE SERPL-SCNC: 97 MMOL/L (ref 100–108)
CHOLEST SERPL-MCNC: 214 MG/DL (ref 50–200)
CO2 SERPL-SCNC: 27 MMOL/L (ref 21–32)
CREAT SERPL-MCNC: 1.11 MG/DL (ref 0.6–1.3)
GFR SERPL CREATININE-BSD FRML MDRD: 62 ML/MIN/1.73SQ M
GLUCOSE P FAST SERPL-MCNC: 93 MG/DL (ref 65–99)
HDLC SERPL-MCNC: 76 MG/DL
LDLC SERPL DIRECT ASSAY-MCNC: 118 MG/DL (ref 0–100)
POTASSIUM SERPL-SCNC: 3.9 MMOL/L (ref 3.5–5.3)
PROT SERPL-MCNC: 7.3 G/DL (ref 6.4–8.2)
SODIUM SERPL-SCNC: 132 MMOL/L (ref 136–145)

## 2020-07-24 PROCEDURE — 80053 COMPREHEN METABOLIC PANEL: CPT

## 2020-07-24 PROCEDURE — 83721 ASSAY OF BLOOD LIPOPROTEIN: CPT

## 2020-07-24 PROCEDURE — 82465 ASSAY BLD/SERUM CHOLESTEROL: CPT

## 2020-07-24 PROCEDURE — 83718 ASSAY OF LIPOPROTEIN: CPT

## 2020-07-24 PROCEDURE — 36415 COLL VENOUS BLD VENIPUNCTURE: CPT

## 2020-07-31 ENCOUNTER — OFFICE VISIT (OUTPATIENT)
Dept: FAMILY MEDICINE CLINIC | Facility: CLINIC | Age: 80
End: 2020-07-31
Payer: MEDICARE

## 2020-07-31 VITALS
HEIGHT: 63 IN | RESPIRATION RATE: 18 BRPM | TEMPERATURE: 98 F | DIASTOLIC BLOOD PRESSURE: 72 MMHG | BODY MASS INDEX: 25.69 KG/M2 | WEIGHT: 145 LBS | SYSTOLIC BLOOD PRESSURE: 158 MMHG | HEART RATE: 78 BPM

## 2020-07-31 DIAGNOSIS — I10 ESSENTIAL HYPERTENSION: ICD-10-CM

## 2020-07-31 DIAGNOSIS — I25.810 CORONARY ARTERY DISEASE INVOLVING CORONARY BYPASS GRAFT OF NATIVE HEART WITHOUT ANGINA PECTORIS: ICD-10-CM

## 2020-07-31 DIAGNOSIS — E87.1 HYPONATREMIA: ICD-10-CM

## 2020-07-31 DIAGNOSIS — E78.2 MIXED HYPERLIPIDEMIA: ICD-10-CM

## 2020-07-31 DIAGNOSIS — I65.23 BILATERAL CAROTID ARTERY STENOSIS: Primary | ICD-10-CM

## 2020-07-31 PROBLEM — I63.9 THALAMIC STROKE (HCC): Status: RESOLVED | Noted: 2018-10-23 | Resolved: 2020-07-31

## 2020-07-31 PROBLEM — I63.81 THALAMIC STROKE (HCC): Status: RESOLVED | Noted: 2018-10-23 | Resolved: 2020-07-31

## 2020-07-31 PROCEDURE — 4040F PNEUMOC VAC/ADMIN/RCVD: CPT | Performed by: FAMILY MEDICINE

## 2020-07-31 PROCEDURE — 1160F RVW MEDS BY RX/DR IN RCRD: CPT | Performed by: FAMILY MEDICINE

## 2020-07-31 PROCEDURE — 3078F DIAST BP <80 MM HG: CPT | Performed by: FAMILY MEDICINE

## 2020-07-31 PROCEDURE — 1036F TOBACCO NON-USER: CPT | Performed by: FAMILY MEDICINE

## 2020-07-31 PROCEDURE — 3077F SYST BP >= 140 MM HG: CPT | Performed by: FAMILY MEDICINE

## 2020-07-31 PROCEDURE — 3008F BODY MASS INDEX DOCD: CPT | Performed by: FAMILY MEDICINE

## 2020-07-31 PROCEDURE — 99214 OFFICE O/P EST MOD 30 MIN: CPT | Performed by: FAMILY MEDICINE

## 2020-07-31 NOTE — ASSESSMENT & PLAN NOTE
Blood pressure today is elevated  Patient reports that it has been much better recently  Based on that, no changes  Continue with amlodipine, atenolol, hydrochlorothiazide

## 2020-07-31 NOTE — PROGRESS NOTES
Assessment and Plan:    Problem List Items Addressed This Visit     Bilateral carotid artery disease ([address] ) - Primary     Patient had his last carotid ultrasound done in 2018  At this point, he is not interested in further surgery, would prefer to have medical treatment as much as possible  He is currently on statins, as well as blood pressure meds  He will follow-up in the future with this office  I did ask him about getting a carotid ultrasound  He declined at this time  CAD (coronary artery disease)     Patient doing relatively well  Blood pressure is slightly elevated today, but the remainder of his labs and studies were reasonable  Cholesterol is slightly elevated, more specifically the LDL  HDL was excellent, however  Patient is satisfied with his current situation, and did not wish to have further changes  Will continue to observe, check in 6 months  Hyperlipidemia     Patient's HDL is fantastic  His LDL, however, is slightly elevated  Based on the fact that he is already on atorvastatin 10 mg, he did not wish to increase, and I would agree as there can be an increased risk of problems as he ages  Continue with 10 mg, check in 6 months  Relevant Orders    Comprehensive metabolic panel    Lipid panel    Hypertension     Blood pressure today is elevated  Patient reports that it has been much better recently  Based on that, no changes  Continue with amlodipine, atenolol, hydrochlorothiazide  Relevant Orders    Comprehensive metabolic panel    TSH, 3rd generation    CBC and differential    Hyponatremia     Patient's sodium is slightly low today  It has been lower previously  Hyponatremia has been a long-standing issue for him  No changes at the moment           Relevant Orders    Comprehensive metabolic panel                 Diagnoses and all orders for this visit:    Bilateral carotid artery stenosis    Coronary artery disease involving coronary bypass graft of native heart without angina pectoris    Essential hypertension  -     Comprehensive metabolic panel; Future  -     TSH, 3rd generation; Future  -     CBC and differential; Future    Mixed hyperlipidemia  -     Comprehensive metabolic panel; Future  -     Lipid panel; Future    Hyponatremia  -     Comprehensive metabolic panel; Future              Subjective:      Patient ID: Tanesha Poe is a 78 y o  male  CC:    Chief Complaint   Patient presents with    Follow-up     6 month       HPI:    Patient is here to follow-up on multiple issues  With regard to cholesterol:  Patient is currently on atorvastatin  He is not having any muscle aches, though he does have some weakness at times  Cholesterol panel was reviewed today  Reviewed about possibly holding statins  Hypertension: He is on Norvasc, atenolol, HCTZ  No orthostasis  CAD: No CP or pressure  Hyponatremia:  Has been a problem for a while now  His sodium has varied quite a bit  Denies any current problems with it in specific  CVA:  Did have CVA previously  No long-term side effects at this point  The following portions of the patient's history were reviewed and updated as appropriate: allergies, current medications, past family history, past medical history, past social history, past surgical history and problem list       Review of Systems   Constitutional: Negative  HENT: Negative  Eyes: Negative  Respiratory: Negative  Cardiovascular: Negative  Gastrointestinal: Negative  Endocrine: Negative  Genitourinary: Negative  Musculoskeletal: Negative  Skin: Negative  Allergic/Immunologic: Negative  Neurological: Negative  Hematological: Negative  Psychiatric/Behavioral: Negative  Data to review:   Sodium 132, potassium 3 9  Calcium 10 1  Creatinine 1 11, GFR:  62  AST 21, ALT 29  Blood sugar 93  Total cholesterol 214, , HDL 76       Objective:    Vitals:    07/31/20 1137 BP: 158/72   Pulse: 78   Resp: 18   Temp: 98 °F (36 7 °C)   TempSrc: Temporal   Weight: 65 8 kg (145 lb)   Height: 5' 3" (1 6 m)        Physical Exam   Constitutional: He appears well-developed and well-nourished  HENT:   Head: Normocephalic and atraumatic  Neck: Normal range of motion  Neck supple  Cardiovascular: Normal rate, regular rhythm and normal heart sounds  Exam reveals no gallop and no friction rub  No murmur heard  Pulses:       Carotid pulses are 2+ on the right side, and 2+ on the left side  Pulmonary/Chest: Effort normal and breath sounds normal  No respiratory distress  He has no wheezes  He has no rales  Nursing note and vitals reviewed  BMI Counseling: Body mass index is 25 69 kg/m²  The BMI is above normal  Nutrition recommendations include decreasing portion sizes, encouraging healthy choices of fruits and vegetables, decreasing fast food intake, consuming healthier snacks, limiting drinks that contain sugar, moderation in carbohydrate intake, increasing intake of lean protein, reducing intake of saturated and trans fat and reducing intake of cholesterol  Exercise recommendations include exercising 3-5 times per week  No pharmacotherapy was ordered

## 2020-07-31 NOTE — ASSESSMENT & PLAN NOTE
Patient had his last carotid ultrasound done in 2018  At this point, he is not interested in further surgery, would prefer to have medical treatment as much as possible  He is currently on statins, as well as blood pressure meds  He will follow-up in the future with this office  I did ask him about getting a carotid ultrasound  He declined at this time

## 2020-07-31 NOTE — ASSESSMENT & PLAN NOTE
Patient doing relatively well  Blood pressure is slightly elevated today, but the remainder of his labs and studies were reasonable  Cholesterol is slightly elevated, more specifically the LDL  HDL was excellent, however  Patient is satisfied with his current situation, and did not wish to have further changes  Will continue to observe, check in 6 months

## 2020-07-31 NOTE — ASSESSMENT & PLAN NOTE
Patient's sodium is slightly low today  It has been lower previously  Hyponatremia has been a long-standing issue for him  No changes at the moment

## 2020-07-31 NOTE — ASSESSMENT & PLAN NOTE
Patient's HDL is fantastic  His LDL, however, is slightly elevated  Based on the fact that he is already on atorvastatin 10 mg, he did not wish to increase, and I would agree as there can be an increased risk of problems as he ages  Continue with 10 mg, check in 6 months

## 2020-07-31 NOTE — PATIENT INSTRUCTIONS
Problem List Items Addressed This Visit     Bilateral carotid artery disease ([facility] [address] ) - Primary     Patient had his last carotid ultrasound done in 2018  At this point, he is not interested in further surgery, would prefer to have medical treatment as much as possible  He is currently on statins, as well as blood pressure meds  He will follow-up in the future with this office  I did ask him about getting a carotid ultrasound  He declined at this time  CAD (coronary artery disease)     Patient doing relatively well  Blood pressure is slightly elevated today, but the remainder of his labs and studies were reasonable  Cholesterol is slightly elevated, more specifically the LDL  HDL was excellent, however  Patient is satisfied with his current situation, and did not wish to have further changes  Will continue to observe, check in 6 months  Hyperlipidemia     Patient's HDL is fantastic  His LDL, however, is slightly elevated  Based on the fact that he is already on atorvastatin 10 mg, he did not wish to increase, and I would agree as there can be an increased risk of problems as he ages  Continue with 10 mg, check in 6 months  Relevant Orders    Comprehensive metabolic panel    Lipid panel    Hypertension     Blood pressure today is elevated  Patient reports that it has been much better recently  Based on that, no changes  Continue with amlodipine, atenolol, hydrochlorothiazide  Relevant Orders    Comprehensive metabolic panel    TSH, 3rd generation    CBC and differential    Hyponatremia     Patient's sodium is slightly low today  It has been lower previously  Hyponatremia has been a long-standing issue for him  No changes at the moment  Relevant Orders    Comprehensive metabolic panel          COVID 19 Instructions    Juliet Akers was advised to limit contact with others to essential tasks such as getting food, medications, and medical care      Proper handwashing reviewed, and Hand sanitzer when washing is not available  If the patient develops symptoms of COVID 19, the patient should call the office as soon as possible  Please try to download Google Duo  Once you do download this on your phone, you will be prompted to add your phone number to the account  After that, he should receive a text from [employer], and use that code to verify your phone number  After that, you should be able to use Google Duo to receive and make video calls  Please download Microsoft Teams to your phone or computer  We will be transitioning to this platform for Video Visits  Instructions for downloading this are available from the office

## 2020-10-11 DIAGNOSIS — E78.00 PURE HYPERCHOLESTEROLEMIA: ICD-10-CM

## 2020-10-12 RX ORDER — ATORVASTATIN CALCIUM 10 MG/1
TABLET, FILM COATED ORAL
Qty: 90 TABLET | Refills: 3 | Status: SHIPPED | OUTPATIENT
Start: 2020-10-12 | End: 2021-09-15

## 2020-11-08 DIAGNOSIS — M19.90 OSTEOARTHRITIS, UNSPECIFIED OSTEOARTHRITIS TYPE, UNSPECIFIED SITE: ICD-10-CM

## 2020-11-08 DIAGNOSIS — K21.9 GASTROESOPHAGEAL REFLUX DISEASE: ICD-10-CM

## 2020-11-09 RX ORDER — MELOXICAM 15 MG/1
TABLET ORAL
Qty: 30 TABLET | Refills: 3 | Status: SHIPPED | OUTPATIENT
Start: 2020-11-09 | End: 2021-03-15

## 2020-11-09 RX ORDER — OMEPRAZOLE 20 MG/1
CAPSULE, DELAYED RELEASE ORAL
Qty: 90 CAPSULE | Refills: 0 | Status: SHIPPED | OUTPATIENT
Start: 2020-11-09 | End: 2021-05-31

## 2021-01-17 DIAGNOSIS — I10 ESSENTIAL HYPERTENSION: ICD-10-CM

## 2021-01-17 RX ORDER — HYDROCHLOROTHIAZIDE 25 MG/1
TABLET ORAL
Qty: 90 TABLET | Refills: 0 | Status: SHIPPED | OUTPATIENT
Start: 2021-01-17 | End: 2021-07-25

## 2021-01-20 DIAGNOSIS — Z23 ENCOUNTER FOR IMMUNIZATION: ICD-10-CM

## 2021-01-26 ENCOUNTER — IMMUNIZATIONS (OUTPATIENT)
Dept: FAMILY MEDICINE CLINIC | Facility: HOSPITAL | Age: 81
End: 2021-01-26

## 2021-01-26 DIAGNOSIS — Z23 ENCOUNTER FOR IMMUNIZATION: Primary | ICD-10-CM

## 2021-01-26 PROCEDURE — 91301 SARS-COV-2 / COVID-19 MRNA VACCINE (MODERNA) 100 MCG: CPT

## 2021-01-26 PROCEDURE — 0011A SARS-COV-2 / COVID-19 MRNA VACCINE (MODERNA) 100 MCG: CPT

## 2021-01-31 DIAGNOSIS — I63.9 THALAMIC STROKE (HCC): ICD-10-CM

## 2021-01-31 DIAGNOSIS — I63.9 CEREBROVASCULAR ACCIDENT (CVA), UNSPECIFIED MECHANISM (HCC): ICD-10-CM

## 2021-02-01 RX ORDER — CLOPIDOGREL BISULFATE 75 MG/1
TABLET ORAL
Qty: 90 TABLET | Refills: 3 | Status: SHIPPED | OUTPATIENT
Start: 2021-02-01 | End: 2021-12-28

## 2021-02-03 ENCOUNTER — LAB (OUTPATIENT)
Dept: LAB | Facility: CLINIC | Age: 81
End: 2021-02-03
Payer: MEDICARE

## 2021-02-03 DIAGNOSIS — I10 ESSENTIAL HYPERTENSION: ICD-10-CM

## 2021-02-03 DIAGNOSIS — E87.1 HYPONATREMIA: ICD-10-CM

## 2021-02-03 DIAGNOSIS — E78.2 MIXED HYPERLIPIDEMIA: ICD-10-CM

## 2021-02-03 LAB
ALBUMIN SERPL BCP-MCNC: 4.2 G/DL (ref 3.5–5)
ALP SERPL-CCNC: 89 U/L (ref 46–116)
ALT SERPL W P-5'-P-CCNC: 26 U/L (ref 12–78)
ANION GAP SERPL CALCULATED.3IONS-SCNC: 4 MMOL/L (ref 4–13)
AST SERPL W P-5'-P-CCNC: 22 U/L (ref 5–45)
BASOPHILS # BLD AUTO: 0.09 THOUSANDS/ΜL (ref 0–0.1)
BASOPHILS NFR BLD AUTO: 1 % (ref 0–1)
BILIRUB SERPL-MCNC: 0.74 MG/DL (ref 0.2–1)
BUN SERPL-MCNC: 13 MG/DL (ref 5–25)
CALCIUM SERPL-MCNC: 9.2 MG/DL (ref 8.3–10.1)
CHLORIDE SERPL-SCNC: 98 MMOL/L (ref 100–108)
CHOLEST SERPL-MCNC: 225 MG/DL (ref 50–200)
CO2 SERPL-SCNC: 32 MMOL/L (ref 21–32)
CREAT SERPL-MCNC: 1.07 MG/DL (ref 0.6–1.3)
EOSINOPHIL # BLD AUTO: 0.27 THOUSAND/ΜL (ref 0–0.61)
EOSINOPHIL NFR BLD AUTO: 4 % (ref 0–6)
ERYTHROCYTE [DISTWIDTH] IN BLOOD BY AUTOMATED COUNT: 13.1 % (ref 11.6–15.1)
GFR SERPL CREATININE-BSD FRML MDRD: 64 ML/MIN/1.73SQ M
GLUCOSE P FAST SERPL-MCNC: 94 MG/DL (ref 65–99)
HCT VFR BLD AUTO: 40.3 % (ref 36.5–49.3)
HDLC SERPL-MCNC: 75 MG/DL
HGB BLD-MCNC: 13.6 G/DL (ref 12–17)
IMM GRANULOCYTES # BLD AUTO: 0.01 THOUSAND/UL (ref 0–0.2)
IMM GRANULOCYTES NFR BLD AUTO: 0 % (ref 0–2)
LDLC SERPL CALC-MCNC: 129 MG/DL (ref 0–100)
LYMPHOCYTES # BLD AUTO: 2.39 THOUSANDS/ΜL (ref 0.6–4.47)
LYMPHOCYTES NFR BLD AUTO: 34 % (ref 14–44)
MCH RBC QN AUTO: 29.5 PG (ref 26.8–34.3)
MCHC RBC AUTO-ENTMCNC: 33.7 G/DL (ref 31.4–37.4)
MCV RBC AUTO: 87 FL (ref 82–98)
MONOCYTES # BLD AUTO: 0.71 THOUSAND/ΜL (ref 0.17–1.22)
MONOCYTES NFR BLD AUTO: 10 % (ref 4–12)
NEUTROPHILS # BLD AUTO: 3.53 THOUSANDS/ΜL (ref 1.85–7.62)
NEUTS SEG NFR BLD AUTO: 51 % (ref 43–75)
NONHDLC SERPL-MCNC: 150 MG/DL
NRBC BLD AUTO-RTO: 0 /100 WBCS
PLATELET # BLD AUTO: 328 THOUSANDS/UL (ref 149–390)
PMV BLD AUTO: 9 FL (ref 8.9–12.7)
POTASSIUM SERPL-SCNC: 3.6 MMOL/L (ref 3.5–5.3)
PROT SERPL-MCNC: 7.4 G/DL (ref 6.4–8.2)
RBC # BLD AUTO: 4.61 MILLION/UL (ref 3.88–5.62)
SODIUM SERPL-SCNC: 134 MMOL/L (ref 136–145)
TRIGL SERPL-MCNC: 106 MG/DL
TSH SERPL DL<=0.05 MIU/L-ACNC: 1.68 UIU/ML (ref 0.36–3.74)
WBC # BLD AUTO: 7 THOUSAND/UL (ref 4.31–10.16)

## 2021-02-03 PROCEDURE — 84443 ASSAY THYROID STIM HORMONE: CPT

## 2021-02-03 PROCEDURE — 80053 COMPREHEN METABOLIC PANEL: CPT

## 2021-02-03 PROCEDURE — 36415 COLL VENOUS BLD VENIPUNCTURE: CPT

## 2021-02-03 PROCEDURE — 85025 COMPLETE CBC W/AUTO DIFF WBC: CPT

## 2021-02-03 PROCEDURE — 80061 LIPID PANEL: CPT

## 2021-02-09 ENCOUNTER — OFFICE VISIT (OUTPATIENT)
Dept: FAMILY MEDICINE CLINIC | Facility: CLINIC | Age: 81
End: 2021-02-09
Payer: MEDICARE

## 2021-02-09 VITALS
HEART RATE: 72 BPM | WEIGHT: 144 LBS | TEMPERATURE: 97.1 F | SYSTOLIC BLOOD PRESSURE: 122 MMHG | HEIGHT: 63 IN | DIASTOLIC BLOOD PRESSURE: 70 MMHG | BODY MASS INDEX: 25.52 KG/M2 | RESPIRATION RATE: 16 BRPM

## 2021-02-09 DIAGNOSIS — Z12.5 PROSTATE CANCER SCREENING: ICD-10-CM

## 2021-02-09 DIAGNOSIS — I10 ESSENTIAL HYPERTENSION: Primary | ICD-10-CM

## 2021-02-09 DIAGNOSIS — K21.9 GERD WITHOUT ESOPHAGITIS: ICD-10-CM

## 2021-02-09 DIAGNOSIS — E87.1 HYPONATREMIA: ICD-10-CM

## 2021-02-09 DIAGNOSIS — I49.9 CARDIAC ARRHYTHMIA, UNSPECIFIED CARDIAC ARRHYTHMIA TYPE: ICD-10-CM

## 2021-02-09 DIAGNOSIS — I25.810 CORONARY ARTERY DISEASE INVOLVING CORONARY BYPASS GRAFT OF NATIVE HEART WITHOUT ANGINA PECTORIS: ICD-10-CM

## 2021-02-09 DIAGNOSIS — I63.50 CEREBROVASCULAR ACCIDENT (CVA) DUE TO OCCLUSION OF CEREBRAL ARTERY (HCC): ICD-10-CM

## 2021-02-09 DIAGNOSIS — E78.2 MIXED HYPERLIPIDEMIA: ICD-10-CM

## 2021-02-09 PROCEDURE — 99214 OFFICE O/P EST MOD 30 MIN: CPT | Performed by: FAMILY MEDICINE

## 2021-02-09 PROCEDURE — G0438 PPPS, INITIAL VISIT: HCPCS | Performed by: FAMILY MEDICINE

## 2021-02-09 PROCEDURE — 1123F ACP DISCUSS/DSCN MKR DOCD: CPT | Performed by: FAMILY MEDICINE

## 2021-02-09 NOTE — PROGRESS NOTES
Assessment and Plan:     Problem List Items Addressed This Visit     None           Preventive health issues were discussed with patient, and age appropriate screening tests were ordered as noted in patient's After Visit Summary  Personalized health advice and appropriate referrals for health education or preventive services given if needed, as noted in patient's After Visit Summary  History of Present Illness:     Patient presents for Medicare Annual Wellness visit    Patient Care Team:  Akash Farr MD as PCP - General     Problem List:     Patient Active Problem List   Diagnosis    Osteoarthrosis    Hyponatremia    Hypertension    Hyperlipidemia    GERD without esophagitis    Benign familial tremor    Occlusion and stenosis of carotid artery    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)    Bilateral [facility] [address] [address]    Arrhythmia    CVA (cerebral vascular accident) ([facility] [address] )    Stenosis of left vertebral artery    CAD (coronary artery disease)    Actinic keratosis      Past Medical and Surgical History:     History reviewed  No pertinent past medical history  Past Surgical History:   Procedure Laterality Date    CORONARY ARTERY BYPASS GRAFT        Family History:     Family History   Problem Relation Age of Onset    Hypertension Mother     Coronary artery disease Son       Social History:        Social History     Socioeconomic History    Marital status:       Spouse name: None    Number of children: None    Years of education: None    Highest education level: None   Occupational History    Occupation: Retired    Social Needs    Financial resource strain: None    Food insecurity     Worry: None     Inability: None    Transportation needs     Medical: None     Non-medical: None   Tobacco Use    Smoking status: Never Smoker    Smokeless tobacco: Never Used    Tobacco comment: former smoker noted in "allscripts" - no secondhand smoke exposure    Substance and Sexual Activity    Alcohol use: Yes     Comment: social    Drug use: No    Sexual activity: None   Lifestyle    Physical activity     Days per week: None     Minutes per session: None    Stress: None   Relationships    Social connections     Talks on phone: None     Gets together: None     Attends Rastafarian service: None     Active member of club or organization: None     Attends meetings of clubs or organizations: None     Relationship status: None    Intimate partner violence     Fear of current or ex partner: None     Emotionally abused: None     Physically abused: None     Forced sexual activity: None   Other Topics Concern    None   Social History Narrative    None      Medications and Allergies:     Current Outpatient Medications   Medication Sig Dispense Refill    amLODIPine (NORVASC) 10 mg tablet take 1 tablet by mouth once daily 90 tablet 3    atenolol (TENORMIN) 25 mg tablet take 1/2 tablet by mouth once daily 45 tablet 3    atorvastatin (LIPITOR) 10 mg tablet take 1 tablet by mouth once daily 90 tablet 3    clopidogrel (PLAVIX) 75 mg tablet take 1 tablet by mouth once daily 90 tablet 3    hydrochlorothiazide (HYDRODIURIL) 25 mg tablet take 1/2 tablet by mouth once daily 90 tablet 0    meloxicam (MOBIC) 15 mg tablet take 1 tablet by mouth once daily 30 tablet 3    nitroglycerin (NITROSTAT) 0 4 mg SL tablet Place under the tongue      omeprazole (PriLOSEC) 20 mg delayed release capsule take 1 capsule by mouth once daily 90 capsule 0    loratadine (CLARITIN) 10 mg tablet Take 1 tablet (10 mg total) by mouth daily for 30 days (Patient not taking: Reported on 7/31/2020) 30 tablet 0     No current facility-administered medications for this visit        Allergies   Allergen Reactions    Vasotec [Enalapril] Angioedema    Doxycycline       Immunizations:     Immunization History   Administered Date(s) Administered    INFLUENZA 08/26/2014    Influenza Split High Dose Preservative Free IM 08/23/2013, 10/10/2016, 10/03/2017    Influenza, high dose seasonal 0 7 mL 10/03/2018, 10/17/2019, 12/02/2020    Influenza, seasonal, injectable 1938, 10/21/2012, 10/13/2015    Pneumococcal Polysaccharide PPV23 1940    SARS-CoV-2 / COVID-19 mRNA IM (Moderna) 01/26/2021    Zoster 1940      Health Maintenance: There are no preventive care reminders to display for this patient  Topic Date Due    DTaP,Tdap,and Td Vaccines (1 - Tdap) 05/22/1961    Pneumococcal Vaccine: 65+ Years (1 of 1 - PPSV23) 05/22/2003      Medicare Health Risk Assessment:     There were no vitals taken for this visit  Eliceo Dias is here for his Subsequent Wellness visit  Health Risk Assessment:   Patient rates overall health as good  Patient feels that their physical health rating is same  Eyesight was rated as same  Hearing was rated as same  Patient feels that their emotional and mental health rating is same  Pain experienced in the last 7 days has been none  Patient states that he has experienced no weight loss or gain in last 6 months  Fall Risk Screening: In the past year, patient has experienced: no history of falling in past year      Home Safety:  Patient does not have trouble with stairs inside or outside of their home  Patient has working smoke alarms and has working carbon monoxide detector  Home safety hazards include: none  Nutrition:   Current diet is Regular  Medications:   Patient is currently taking over-the-counter supplements  OTC medications include: see medication list  Patient is able to manage medications  Activities of Daily Living (ADLs)/Instrumental Activities of Daily Living (IADLs):   Walk and transfer into and out of bed and chair?: Yes  Dress and groom yourself?: Yes    Bathe or shower yourself?: Yes    Feed yourself?  Yes  Do your laundry/housekeeping?: Yes  Manage your money, pay your bills and track your expenses?: Yes  Make your own meals?: Yes    Do your own shopping?: Yes    Previous Hospitalizations:   Any hospitalizations or ED visits within the last 12 months?: No      Advance Care Planning:   Living will: Yes    Durable POA for healthcare:  Yes    Advanced directive: Yes    Advanced directive counseling given: Yes    Five wishes given: No    Patient declined ACP directive: No      Cognitive Screening:   Provider or family/friend/caregiver concerned regarding cognition?: No    PREVENTIVE SCREENINGS      Cardiovascular Screening:    General: Screening Not Indicated and History Lipid Disorder      Diabetes Screening:     General: Screening Current      Colorectal Cancer Screening:     General: Screening Not Indicated      Prostate Cancer Screening:    General: Screening Not Indicated      Osteoporosis Screening:    General: Screening Not Indicated      Abdominal Aortic Aneurysm (AAA) Screening:        General: Screening Not Indicated      Lung Cancer Screening:     General: Screening Not Indicated      Hepatitis C Screening:    General: Screening Not Indicated      Harriet Domingo MD

## 2021-02-09 NOTE — ASSESSMENT & PLAN NOTE
Since patient did previously have CVA, this does increased risk of 2nd  Continues with risk factor modification including treating his blood pressure and cholesterol  Both of them are quite reasonable at the moment

## 2021-02-09 NOTE — ASSESSMENT & PLAN NOTE
Patient's heart rate today was irregular  I offered again to have an EKG and follow-up on the Holter monitor that was noted previously as per request   Patient reports that he is doing well enough, and is not wish to have any testing with regard to this  Understands risks of atrial fibrillation and undiagnosed AFib

## 2021-02-09 NOTE — PATIENT INSTRUCTIONS
Problem List Items Addressed This Visit     Arrhythmia     Patient's heart rate today was irregular  I offered again to have an EKG and follow-up on the Holter monitor that was noted previously as per request   Patient reports that he is doing well enough, and is not wish to have any testing with regard to this  Understands risks of atrial fibrillation and undiagnosed AFib  Relevant Orders    Comprehensive metabolic panel    CAD (coronary artery disease)     Stable at the moment  No current symptoms  Patient feels he is doing quite well  Does not want to have further evaluation at this time  Relevant Orders    Comprehensive metabolic panel    CVA (cerebral vascular accident) ([facility] [address] )     Since patient did previously have CVA, this does increased risk of 2nd  Continues with risk factor modification including treating his blood pressure and cholesterol  Both of them are quite reasonable at the moment  GERD without esophagitis     Doing quite well as far as reflux  Uses the omeprazole 3 times a week  No changes  Hyperlipidemia     Cholesterol is stable  His HDL is outstanding  Tolerating atorvastatin, therefore continue  Check in 6 months  Relevant Orders    Comprehensive metabolic panel    Cholesterol, total    HDL cholesterol    LDL cholesterol, direct    Hypertension - Primary     Blood pressure doing very well  No changes  Relevant Orders    Comprehensive metabolic panel    Hyponatremia     Better vs before  Offered further eval, but he deferred  Relevant Orders    Comprehensive metabolic panel      Other Visit Diagnoses     Prostate cancer screening        Offered PSA testing, patient deferred  This is quite reasonable given his advanced age  COVID 19 Instructions    [place] was advised to limit contact with others to essential tasks such as getting food, medications, and medical care      Proper handwashing reviewed, and Hand tracey when washing is not available  If the patient develops symptoms of COVID 19, the patient should call the office as soon as possible  For [id number] Flu season, it is strongly recommended that Flu Vaccinations be obtained  Please try to download Google Duo  Once you do download this on your phone, you will be prompted to add your phone number to the account  After that, he should receive a text from [employer], and use that code to verify your phone number  After that, you should be able to use Google Duo to receive and make video calls  Please download Microsoft Teams to your phone or computer  We will be transitioning to this platform for Video Visits  Instructions for downloading this are available from the office  We are committed to getting you vaccinated as soon as possible and will be closely following CDC and [facility]  guidelines as they are released and revised  Please refer to our COVID-19 vaccine webpage for the most up to date information on the vaccine and our distribution efforts      Db watkins

## 2021-02-09 NOTE — PROGRESS NOTES
Assessment and Plan:    Problem List Items Addressed This Visit     Arrhythmia     Patient's heart rate today was irregular  I offered again to have an EKG and follow-up on the Holter monitor that was noted previously as per request   Patient reports that he is doing well enough, and is not wish to have any testing with regard to this  Understands risks of atrial fibrillation and undiagnosed AFib  Relevant Orders    Comprehensive metabolic panel    CAD (coronary artery disease)     Stable at the moment  No current symptoms  Patient feels he is doing quite well  Does not want to have further evaluation at this time  Relevant Orders    Comprehensive metabolic panel    CVA (cerebral vascular accident) ([facility] [address] )     Since patient did previously have CVA, this does increased risk of 2nd  Continues with risk factor modification including treating his blood pressure and cholesterol  Both of them are quite reasonable at the moment  GERD without esophagitis     Doing quite well as far as reflux  Uses the omeprazole 3 times a week  No changes  Hyperlipidemia     Cholesterol is stable  His HDL is outstanding  Tolerating atorvastatin, therefore continue  Check in 6 months  Relevant Orders    Comprehensive metabolic panel    Cholesterol, total    HDL cholesterol    LDL cholesterol, direct    Hypertension - Primary     Blood pressure doing very well  No changes  Relevant Orders    Comprehensive metabolic panel    Hyponatremia     Better vs before  Offered further eval, but he deferred  Relevant Orders    Comprehensive metabolic panel      Other Visit Diagnoses     Prostate cancer screening        Offered PSA testing, patient deferred  This is quite reasonable given his advanced age  Diagnoses and all orders for this visit:    Essential hypertension  -     Comprehensive metabolic panel;  Future    Mixed hyperlipidemia  -     Comprehensive metabolic panel; Future  -     Cholesterol, total; Future  -     HDL cholesterol; Future  -     LDL cholesterol, direct; Future    Coronary artery disease involving coronary bypass graft of native heart without angina pectoris  -     Comprehensive metabolic panel; Future    Cardiac arrhythmia, unspecified cardiac arrhythmia type  -     Comprehensive metabolic panel; Future    Cerebrovascular accident (CVA) due to occlusion of cerebral artery (HCC)    Hyponatremia  -     Comprehensive metabolic panel; Future    Prostate cancer screening  Comments:  Offered PSA testing, patient deferred  This is quite reasonable given his advanced age  GERD without esophagitis              Subjective:      Patient ID: Deann Davis is a 78 y o  male  CC:    Chief Complaint   Patient presents with    Follow-up     pt here for a follow up and to review lab results  R Cruz  Medicare Wellness Visit       HPI:    Patient is here to follow-up on several issues  With regard to hyperlipidemia:  Patient is currently on atorvastatin 10 mg  Denies any current problems with it  Hypertension:  Currently on amlodipine 10 mg, atenolol 25 mg, hydrochlorothiazide  Reflux:  Using Omeprazole 3 times a week  CAD:  Denies any current symptoms or problems  CVA noted in history, but he is really not having any problems  Feels well otherwise  The following portions of the patient's history were reviewed and updated as appropriate: allergies, current medications, past family history, past medical history, past social history, past surgical history and problem list       Review of Systems   Constitutional: Negative  HENT: Negative  Eyes: Negative  Respiratory: Negative  Cardiovascular: Negative  Gastrointestinal: Negative  Endocrine: Negative  Genitourinary: Negative  Musculoskeletal: Negative  Skin: Negative  Allergic/Immunologic: Negative  Neurological: Negative  Hematological: Negative  Psychiatric/Behavioral: Negative  Data to review:   Sodium 134, potassium 3 6, calcium 9 2  Creatinine 1 07, GFR:  64  AST 22, ALT 26  Blood sugar 94  Total cholesterol 225, , HDL 75, triglycerides 106  TSH 1 68  White count 7 00, hemoglobin 13 6, hematocrit 40 3, platelets 697  Objective:    Vitals:    02/09/21 1119   BP: 122/70   BP Location: Left arm   Patient Position: Sitting   Cuff Size: Standard   Pulse: 72   Resp: 16   Temp: (!) 97 1 °F (36 2 °C)   TempSrc: Temporal   Weight: 65 3 kg (144 lb)   Height: 5' 3" (1 6 m)        Physical Exam  Vitals signs and nursing note reviewed  Constitutional:       Appearance: Normal appearance  Neck:      Vascular: No carotid bruit  Cardiovascular:      Rate and Rhythm: Normal rate and regular rhythm  Pulses: Normal pulses  Carotid pulses are 2+ on the right side and 2+ on the left side  Heart sounds: Normal heart sounds  No murmur  No gallop  Pulmonary:      Effort: Pulmonary effort is normal  No respiratory distress  Breath sounds: Normal breath sounds  No stridor  No wheezing, rhonchi or rales  Chest:      Chest wall: No tenderness  Neurological:      Mental Status: He is alert

## 2021-02-09 NOTE — ASSESSMENT & PLAN NOTE
Stable at the moment  No current symptoms  Patient feels he is doing quite well  Does not want to have further evaluation at this time

## 2021-02-23 ENCOUNTER — IMMUNIZATIONS (OUTPATIENT)
Dept: FAMILY MEDICINE CLINIC | Facility: HOSPITAL | Age: 81
End: 2021-02-23

## 2021-02-23 DIAGNOSIS — Z23 ENCOUNTER FOR IMMUNIZATION: Primary | ICD-10-CM

## 2021-02-23 PROCEDURE — 0012A SARS-COV-2 / COVID-19 MRNA VACCINE (MODERNA) 100 MCG: CPT

## 2021-02-23 PROCEDURE — 91301 SARS-COV-2 / COVID-19 MRNA VACCINE (MODERNA) 100 MCG: CPT

## 2021-03-14 DIAGNOSIS — M19.90 OSTEOARTHRITIS, UNSPECIFIED OSTEOARTHRITIS TYPE, UNSPECIFIED SITE: ICD-10-CM

## 2021-03-15 RX ORDER — MELOXICAM 15 MG/1
TABLET ORAL
Qty: 30 TABLET | Refills: 3 | Status: SHIPPED | OUTPATIENT
Start: 2021-03-15 | End: 2021-07-07

## 2021-04-16 DIAGNOSIS — I10 ESSENTIAL HYPERTENSION: ICD-10-CM

## 2021-04-16 RX ORDER — ATENOLOL 25 MG/1
TABLET ORAL
Qty: 45 TABLET | Refills: 3 | Status: SHIPPED | OUTPATIENT
Start: 2021-04-16 | End: 2022-03-15

## 2021-05-30 DIAGNOSIS — K21.9 GASTROESOPHAGEAL REFLUX DISEASE: ICD-10-CM

## 2021-05-31 RX ORDER — OMEPRAZOLE 20 MG/1
CAPSULE, DELAYED RELEASE ORAL
Qty: 90 CAPSULE | Refills: 0 | Status: SHIPPED | OUTPATIENT
Start: 2021-05-31 | End: 2022-01-03

## 2021-06-16 DIAGNOSIS — I10 ESSENTIAL HYPERTENSION: ICD-10-CM

## 2021-06-16 RX ORDER — AMLODIPINE BESYLATE 10 MG/1
TABLET ORAL
Qty: 90 TABLET | Refills: 3 | Status: SHIPPED | OUTPATIENT
Start: 2021-06-16 | End: 2022-05-16

## 2021-07-07 DIAGNOSIS — M19.90 OSTEOARTHRITIS, UNSPECIFIED OSTEOARTHRITIS TYPE, UNSPECIFIED SITE: ICD-10-CM

## 2021-07-07 RX ORDER — MELOXICAM 15 MG/1
TABLET ORAL
Qty: 30 TABLET | Refills: 3 | Status: SHIPPED | OUTPATIENT
Start: 2021-07-07 | End: 2021-10-27

## 2021-07-24 DIAGNOSIS — I10 ESSENTIAL HYPERTENSION: ICD-10-CM

## 2021-07-25 RX ORDER — HYDROCHLOROTHIAZIDE 25 MG/1
TABLET ORAL
Qty: 90 TABLET | Refills: 0 | Status: SHIPPED | OUTPATIENT
Start: 2021-07-25 | End: 2022-01-24

## 2021-08-10 ENCOUNTER — APPOINTMENT (OUTPATIENT)
Dept: LAB | Facility: CLINIC | Age: 81
End: 2021-08-10
Payer: MEDICARE

## 2021-08-10 DIAGNOSIS — I10 ESSENTIAL HYPERTENSION: ICD-10-CM

## 2021-08-10 DIAGNOSIS — E78.2 MIXED HYPERLIPIDEMIA: ICD-10-CM

## 2021-08-10 DIAGNOSIS — E87.1 HYPONATREMIA: ICD-10-CM

## 2021-08-10 DIAGNOSIS — I49.9 CARDIAC ARRHYTHMIA, UNSPECIFIED CARDIAC ARRHYTHMIA TYPE: ICD-10-CM

## 2021-08-10 DIAGNOSIS — I25.810 CORONARY ARTERY DISEASE INVOLVING CORONARY BYPASS GRAFT OF NATIVE HEART WITHOUT ANGINA PECTORIS: ICD-10-CM

## 2021-08-10 LAB
ALBUMIN SERPL BCP-MCNC: 3.7 G/DL (ref 3.5–5)
ALP SERPL-CCNC: 84 U/L (ref 46–116)
ALT SERPL W P-5'-P-CCNC: 26 U/L (ref 12–78)
ANION GAP SERPL CALCULATED.3IONS-SCNC: 8 MMOL/L (ref 4–13)
AST SERPL W P-5'-P-CCNC: 18 U/L (ref 5–45)
BILIRUB SERPL-MCNC: 0.8 MG/DL (ref 0.2–1)
BUN SERPL-MCNC: 15 MG/DL (ref 5–25)
CALCIUM SERPL-MCNC: 9.4 MG/DL (ref 8.3–10.1)
CHLORIDE SERPL-SCNC: 98 MMOL/L (ref 100–108)
CHOLEST SERPL-MCNC: 227 MG/DL (ref 50–200)
CO2 SERPL-SCNC: 27 MMOL/L (ref 21–32)
CREAT SERPL-MCNC: 1.11 MG/DL (ref 0.6–1.3)
GFR SERPL CREATININE-BSD FRML MDRD: 61 ML/MIN/1.73SQ M
GLUCOSE P FAST SERPL-MCNC: 97 MG/DL (ref 65–99)
HDLC SERPL-MCNC: 69 MG/DL
LDLC SERPL DIRECT ASSAY-MCNC: 125 MG/DL (ref 0–100)
POTASSIUM SERPL-SCNC: 3.7 MMOL/L (ref 3.5–5.3)
PROT SERPL-MCNC: 7.3 G/DL (ref 6.4–8.2)
SODIUM SERPL-SCNC: 133 MMOL/L (ref 136–145)

## 2021-08-10 PROCEDURE — 82465 ASSAY BLD/SERUM CHOLESTEROL: CPT

## 2021-08-10 PROCEDURE — 80053 COMPREHEN METABOLIC PANEL: CPT

## 2021-08-10 PROCEDURE — 83718 ASSAY OF LIPOPROTEIN: CPT

## 2021-08-10 PROCEDURE — 36415 COLL VENOUS BLD VENIPUNCTURE: CPT

## 2021-08-10 PROCEDURE — 83721 ASSAY OF BLOOD LIPOPROTEIN: CPT

## 2021-08-17 ENCOUNTER — OFFICE VISIT (OUTPATIENT)
Dept: FAMILY MEDICINE CLINIC | Facility: CLINIC | Age: 81
End: 2021-08-17
Payer: MEDICARE

## 2021-08-17 VITALS
DIASTOLIC BLOOD PRESSURE: 80 MMHG | TEMPERATURE: 98.3 F | BODY MASS INDEX: 25.34 KG/M2 | HEIGHT: 63 IN | SYSTOLIC BLOOD PRESSURE: 150 MMHG | WEIGHT: 143 LBS | HEART RATE: 62 BPM

## 2021-08-17 DIAGNOSIS — E87.1 HYPONATREMIA: ICD-10-CM

## 2021-08-17 DIAGNOSIS — G47.00 INSOMNIA, UNSPECIFIED TYPE: ICD-10-CM

## 2021-08-17 DIAGNOSIS — Z23 NEED FOR VACCINATION AGAINST STREPTOCOCCUS PNEUMONIAE USING PNEUMOCOCCAL CONJUGATE VACCINE 13: ICD-10-CM

## 2021-08-17 DIAGNOSIS — I25.810 CORONARY ARTERY DISEASE INVOLVING CORONARY BYPASS GRAFT OF NATIVE HEART WITHOUT ANGINA PECTORIS: Primary | ICD-10-CM

## 2021-08-17 DIAGNOSIS — E78.2 MIXED HYPERLIPIDEMIA: ICD-10-CM

## 2021-08-17 DIAGNOSIS — L91.8 SKIN TAG: ICD-10-CM

## 2021-08-17 DIAGNOSIS — I10 ESSENTIAL HYPERTENSION: ICD-10-CM

## 2021-08-17 PROCEDURE — 90670 PCV13 VACCINE IM: CPT | Performed by: FAMILY MEDICINE

## 2021-08-17 PROCEDURE — G0009 ADMIN PNEUMOCOCCAL VACCINE: HCPCS | Performed by: FAMILY MEDICINE

## 2021-08-17 PROCEDURE — 99214 OFFICE O/P EST MOD 30 MIN: CPT | Performed by: FAMILY MEDICINE

## 2021-08-17 NOTE — ASSESSMENT & PLAN NOTE
Sodium is again down versus before, but not severely  Check labs in 6 months  No changes at the moment

## 2021-08-17 NOTE — ASSESSMENT & PLAN NOTE
Patient has significant issues with regard to insomnia as discussed in the HPI  It is more issues with dreams  Question if this is all related to sleep apnea  The only way to figure that out in specific would be to do a sleep study, which he is not interested in at the moment  Reviewed with him about the possibility of medications, and he also did not wish to start that  He is going to increase the melatonin dosage a bit and see if that makes a difference  Beyond that, no changes at the moment  Reviewed specifically Ambien, and he is not interested because of the controlled substance nature of the medication

## 2021-08-17 NOTE — ASSESSMENT & PLAN NOTE
Cholesterol is a little high for his history of vascular issues  Patient also has a very high HDL which is excellent and somewhat protective  Given his advanced age, will hold off on increasing the atorvastatin, but I do feel he should continue to take the 10 mg daily

## 2021-08-17 NOTE — PATIENT INSTRUCTIONS
Problem List Items Addressed This Visit     CAD (coronary artery disease) - Primary     Stable at the moment  No current symptoms or signs of CAD issues  Will follow-up in the future  Hyperlipidemia     Cholesterol is a little high for his history of vascular issues  Patient also has a very high HDL which is excellent and somewhat protective  Given his advanced age, will hold off on increasing the atorvastatin, but I do feel he should continue to take the 10 mg daily  Relevant Orders    Cholesterol, total    Comprehensive metabolic panel    HDL cholesterol    LDL cholesterol, direct    Hypertension     Blood pressure today was reasonable with current treatments  No changes  Check in 6 months  Hyponatremia     Sodium is again down versus before, but not severely  Check labs in 6 months  No changes at the moment  Insomnia     Patient has significant issues with regard to insomnia as discussed in the HPI  It is more issues with dreams  Question if this is all related to sleep apnea  The only way to figure that out in specific would be to do a sleep study, which he is not interested in at the moment  Reviewed with him about the possibility of medications, and he also did not wish to start that  He is going to increase the melatonin dosage a bit and see if that makes a difference  Beyond that, no changes at the moment  Reviewed specifically Ambien, and he is not interested because of the controlled substance nature of the medication  Other Visit Diagnoses     Skin tag        Skin tag noted left lower lid  If irritated, plastics or Ophthalmology to follow-up      Need for vaccination against Streptococcus pneumoniae using pneumococcal conjugate vaccine 13        Relevant Orders    PNEUMOCOCCAL CONJUGATE VACCINE 13-VALENT GREATER THAN 6 MONTHS (Completed)          COVID 19 Instructions    Yves Moreno was advised to limit contact with others to essential tasks such as getting food, medications, and medical care  Proper handwashing reviewed, and Hand sanitzer when washing is not available  If the patient develops symptoms of COVID 19, the patient should call the office as soon as possible  For 0435-3939 Flu season, it is strongly recommended that Flu Vaccinations be obtained  Virtual Visits may be conducted in several ways: Gurpreet: You should get a text message when the provider is ready to see you  Click on the link in the text message, and the call should start  You will need to type in your name, and allow camera and microphone access  This is HIPPA compliant, and secure  Please try to download Google Duo  Once you do download this on your phone, you will be prompted to add your phone number to the account  After that, he should receive a text from [employer], and use that code to verify your phone number  After that, you should be able to use Google Duo to receive and make video calls  Please download Microsoft Teams to your phone or computer  You would get an email with the meeting after scheduling with the office  You will Join the meeting, and wait there till the provider joins as well  Instructions for downloading this are available from the office  This is HIPPA compliant, and secure  We are committed to getting you vaccinated as soon as possible and will be closely following CDC and [facility]  guidelines as they are released and revised  Please refer to our COVID-19 vaccine webpage for the most up to date information on the vaccine and our distribution efforts  Kosher[employer] tn    OUR NEW LOCATION:  Starting around 28June2021, our new location and phone are:    [address], [address] [place] [address], [address]  Fax: [phone]    Lab services and OB/GYN will be at this location as well

## 2021-08-17 NOTE — PROGRESS NOTES
Assessment and Plan:    Problem List Items Addressed This Visit     CAD (coronary artery disease) - Primary     Stable at the moment  No current symptoms or signs of CAD issues  Will follow-up in the future  Hyperlipidemia     Cholesterol is a little high for his history of vascular issues  Patient also has a very high HDL which is excellent and somewhat protective  Given his advanced age, will hold off on increasing the atorvastatin, but I do feel he should continue to take the 10 mg daily  Relevant Orders    Cholesterol, total    Comprehensive metabolic panel    HDL cholesterol    LDL cholesterol, direct    Hypertension     Blood pressure today was reasonable with current treatments  No changes  Check in 6 months  Hyponatremia     Sodium is again down versus before, but not severely  Check labs in 6 months  No changes at the moment  Insomnia     Patient has significant issues with regard to insomnia as discussed in the HPI  It is more issues with dreams  Question if this is all related to sleep apnea  The only way to figure that out in specific would be to do a sleep study, which he is not interested in at the moment  Reviewed with him about the possibility of medications, and he also did not wish to start that  He is going to increase the melatonin dosage a bit and see if that makes a difference  Beyond that, no changes at the moment  Reviewed specifically Ambien, and he is not interested because of the controlled substance nature of the medication  Other Visit Diagnoses     Skin tag        Skin tag noted left lower lid  If irritated, plastics or Ophthalmology to follow-up      Need for vaccination against Streptococcus pneumoniae using pneumococcal conjugate vaccine 13        Relevant Orders    PNEUMOCOCCAL CONJUGATE VACCINE 13-VALENT GREATER THAN 6 MONTHS (Completed)                 Diagnoses and all orders for this visit:    Coronary artery disease involving coronary bypass graft of native heart without angina pectoris    Essential hypertension    Mixed hyperlipidemia  -     Cholesterol, total; Future  -     Comprehensive metabolic panel; Future  -     HDL cholesterol; Future  -     LDL cholesterol, direct; Future    Hyponatremia    Skin tag  Comments:  Skin tag noted left lower lid  If irritated, plastics or Ophthalmology to follow-up  Insomnia, unspecified type    Need for vaccination against Streptococcus pneumoniae using pneumococcal conjugate vaccine 13  -     PNEUMOCOCCAL CONJUGATE VACCINE 13-VALENT GREATER THAN 6 MONTHS    Other orders  -     Acetaminophen (Tylenol) 325 MG CAPS; Take by mouth              Subjective:      Patient ID: Ayaka Olson is a 78 y o  male  CC:    Chief Complaint   Patient presents with    Hyperlipidemia    Hypertension     Review BW results   Insomnia     Pt requests sleep aid   Skin Problem     Skin tag left eye he would checked  -  [facility]       HPI:    Patient is here for several issues  First:  Hypertension and hyperlipidemia:  Reviewed labs and medication  Hypertension: On amlodipine 10, and atenolol 25, hydrochlorothiazide  Denies any current problems or issues  Hyperlipidemia:  Currently on atorvastatin 10 mg  He also reports that he would like something for sleep  He is having strange dreams at times  He is noting problems with sleep  He does not feel he snores, but has been told he does  No witnessed apneas  He is taking "Good sleep", melatonin  He wondered about [place]  He has a skin tag that he would like to have checked a risky left eye  Patient is due for Prevnar today  CAD: No concerns  The following portions of the patient's history were reviewed and updated as appropriate: allergies, current medications, past family history, past medical history, past social history, past surgical history and problem list       Review of Systems   Constitutional: Negative  HENT: Negative  Eyes: Negative  Respiratory: Negative  Cardiovascular: Negative  Gastrointestinal: Negative  Endocrine: Negative  Genitourinary: Negative  Musculoskeletal: Negative  Skin: Negative  Allergic/Immunologic: Negative  Neurological: Negative  Hematological: Negative  Psychiatric/Behavioral: Negative  Data to review:   Sodium 133, potassium 3 7, calcium 9 4  Blood sugar 97  AST 18, ALT 26   Creatinine 1  11  GFR 61  Total cholesterol 227, , HDL 69  Objective:    Vitals:    08/17/21 1145   BP: 150/80   BP Location: Left arm   Patient Position: Sitting   Cuff Size: Standard   Pulse: 62   Temp: 98 3 °F (36 8 °C)   TempSrc: Oral   Weight: 64 9 kg (143 lb)   Height: 5' 3" (1 6 m)        Physical Exam  Vitals and nursing note reviewed  Constitutional:       Appearance: Normal appearance  Neck:      Vascular: No carotid bruit  Cardiovascular:      Rate and Rhythm: Normal rate and regular rhythm  Pulses: Normal pulses  Carotid pulses are 2+ on the right side and 2+ on the left side  Heart sounds: Normal heart sounds  No murmur heard  No gallop  Pulmonary:      Effort: Pulmonary effort is normal  No respiratory distress  Breath sounds: Normal breath sounds  No stridor  No wheezing, rhonchi or rales  Chest:      Chest wall: No tenderness  Neurological:      Mental Status: He is alert  BMI Counseling: Body mass index is 25 33 kg/m²  The BMI is above normal  Nutrition recommendations include decreasing portion sizes, encouraging healthy choices of fruits and vegetables, decreasing fast food intake, consuming healthier snacks, limiting drinks that contain sugar, moderation in carbohydrate intake, increasing intake of lean protein, reducing intake of saturated and trans fat and reducing intake of cholesterol  Exercise recommendations include exercising 3-5 times per week  No pharmacotherapy was ordered

## 2021-09-13 DIAGNOSIS — E78.00 PURE HYPERCHOLESTEROLEMIA: ICD-10-CM

## 2021-09-15 RX ORDER — ATORVASTATIN CALCIUM 10 MG/1
TABLET, FILM COATED ORAL
Qty: 90 TABLET | Refills: 3 | Status: SHIPPED | OUTPATIENT
Start: 2021-09-15 | End: 2022-04-07

## 2021-10-27 DIAGNOSIS — M19.90 OSTEOARTHRITIS, UNSPECIFIED OSTEOARTHRITIS TYPE, UNSPECIFIED SITE: ICD-10-CM

## 2021-10-27 RX ORDER — MELOXICAM 15 MG/1
TABLET ORAL
Qty: 30 TABLET | Refills: 3 | Status: SHIPPED | OUTPATIENT
Start: 2021-10-27 | End: 2022-03-02

## 2021-12-28 DIAGNOSIS — I63.9 CEREBROVASCULAR ACCIDENT (CVA), UNSPECIFIED MECHANISM (HCC): ICD-10-CM

## 2021-12-28 DIAGNOSIS — I63.9 THALAMIC STROKE (HCC): ICD-10-CM

## 2021-12-28 RX ORDER — CLOPIDOGREL BISULFATE 75 MG/1
TABLET ORAL
Qty: 90 TABLET | Refills: 3 | Status: SHIPPED | OUTPATIENT
Start: 2021-12-28

## 2022-01-21 ENCOUNTER — PROCEDURE VISIT (OUTPATIENT)
Dept: FAMILY MEDICINE CLINIC | Facility: CLINIC | Age: 82
End: 2022-01-21
Payer: MEDICARE

## 2022-01-21 VITALS
HEART RATE: 71 BPM | BODY MASS INDEX: 25.91 KG/M2 | DIASTOLIC BLOOD PRESSURE: 86 MMHG | SYSTOLIC BLOOD PRESSURE: 136 MMHG | TEMPERATURE: 97.6 F | WEIGHT: 146.2 LBS | HEIGHT: 63 IN

## 2022-01-21 DIAGNOSIS — S01.81XA FACIAL LACERATION, INITIAL ENCOUNTER: Primary | ICD-10-CM
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
## 2022-01-21 PROCEDURE — 99213 OFFICE O/P EST LOW 20 MIN: CPT | Performed by: FAMILY MEDICINE

## 2022-01-21 NOTE — PATIENT INSTRUCTIONS
Problem List Items Addressed This Visit     Facial laceration - Primary     Patient does have facial laceration  Eight sutures were removed today as documented in the physical   This was without incident  Reviewed the documentation from [facility], and it appears that there were 3 on the bridge of the nose, and 6 across the eyebrow, but I could not find the 6th suture in place  Will follow-up as needed  Patient tolerated quite well  Does not appear to have any other problems at this time  Relevant Orders    Suture removal    Suture removal    Fall     Patient did have a fall  Appears to have been related to poor lighting  Would recommend that he have better lighting when he goes into a room  COVID 19 Instructions    Kika Elias was advised to limit contact with others to essential tasks such as getting food, medications, and medical care  Proper handwashing reviewed, and Hand sanitzer when washing is not available  If the patient develops symptoms of COVID 19, the patient should call the office as soon as possible  For 1444-3412 Flu season, it is strongly recommended that Flu Vaccinations be obtained  Virtual Visits:  Gurpreet: This works on smart phones (any phone with Internet browsing capability)  You should get a text message when the provider is ready to see you  Click on the link in the text message, and the call should start  You will need to type in your name, and allow camera and microphone access  This is HIPPA compliant, and secure  If you have not already done so, get immunized to COVID 19  We are committed to getting you vaccinated as soon as possible and will be closely following CDC and [facility]  guidelines as they are released and revised  Please refer to our COVID-19 vaccine webpage for the most up to date information on the vaccine and our distribution efforts      This site will also have the most up to date recommendations for COVID booster vaccine  Db tn    Call [phone] ([phone]), option 7    OUR NEW LOCATION:    [address], [facility] [address], [place], [address]  Fax: [phone]    Lab services and OB/GYN are at this location as well

## 2022-01-21 NOTE — PROGRESS NOTES
Assessment and Plan:    Problem List Items Addressed This Visit     Facial laceration - Primary     Patient does have facial laceration  Eight sutures were removed today as documented in the physical   This was without incident  Reviewed the documentation from [facility], and it appears that there were 3 on the bridge of the nose, and 6 across the eyebrow, but I could not find the 6th suture in place  Will follow-up as needed  Patient tolerated quite well  Does not appear to have any other problems at this time  Fall     Patient did have a fall  Appears to have been related to poor lighting  Would recommend that he have better lighting when he goes into a room  Diagnoses and all orders for this visit:    Facial laceration, initial encounter    Fall, initial encounter              Subjective:      Patient ID: Juliet Akers is a 78 y o  male  CC:    Chief Complaint   Patient presents with    Suture / Staple Removal     Patient here for suture removal on left side fo forehead       HPI:    Patient was recently at a [employer], but when he went in to use the restroom, the automatic like did not come on, and he therefore then tripped, and hit his head  He went to the emergency room  While he was there they placed sutures across the bridge of the nose, and up above the left eyebrow  Today, he comes in 5 days post suture Ng, for removal     Doing quite well at this point  Denies any current problems with vision, headaches, no loss of consciousness  No other changes other than some bruising  Reviewed ER note via Care everywhere  The following portions of the patient's history were reviewed and updated as appropriate: allergies, current medications, past family history, past medical history, past social history, past surgical history and problem list       Review of Systems   Constitutional: Negative for chills and fever  HENT: Negative for ear pain and sore throat  Eyes: Negative for pain and visual disturbance  Respiratory: Negative for cough and shortness of breath  Cardiovascular: Negative for chest pain and palpitations  Gastrointestinal: Negative for abdominal pain and vomiting  Genitourinary: Negative for dysuria and hematuria  Musculoskeletal: Negative for arthralgias and back pain  Skin: Negative for color change and rash  Neurological: Negative for seizures and syncope  All other systems reviewed and are negative  Data to review:       Objective:    Vitals:    01/21/22 1402   BP: 136/86   BP Location: Left arm   Patient Position: Sitting   Cuff Size: Adult   Pulse: 71   Temp: 97 6 °F (36 4 °C)   TempSrc: Temporal   Weight: 66 3 kg (146 lb 3 2 oz)   Height: 5' 3" (1 6 m)        Physical Exam  Vitals and nursing note reviewed  HENT:      Head:        Comments: Laceration in the drawing  Five sutures removed from the superior wound over the eyebrow, and 3 removed from the bridge of the nose  Reviewed, no other sutures in place noted  BMI Counseling: Body mass index is 25 9 kg/m²  The BMI is above normal  Nutrition recommendations include decreasing portion sizes, encouraging healthy choices of fruits and vegetables, decreasing fast food intake, consuming healthier snacks, limiting drinks that contain sugar, moderation in carbohydrate intake, increasing intake of lean protein, reducing intake of saturated and trans fat and reducing intake of cholesterol  Exercise recommendations include exercising 3-5 times per week  No pharmacotherapy was ordered  Rationale for BMI follow-up plan is due to patient being overweight or obese  Suture removal    Date/Time: 1/21/2022 2:23 PM  Performed by: Montrell Mckeon MD  Authorized by: Montrell Mckeon MD   Universal Protocol:  Consent: Verbal consent obtained    Consent given by: patient  Patient identity confirmed: verbally with patient        Patient location:  Clinic  Location:     Location: Head/neck    Head/neck location:  Eyebrow    Eyebrow location:  L eyebrow  Procedure details: Tools used:  Scissors and tweezers    Wound appearance:  No sign(s) of infection, good wound healing, clean and nontender    Number of sutures removed:  5  Post-procedure details:     Post-removal:  No dressing applied    Patient tolerance of procedure: Tolerated well, no immediate complications  Suture removal    Date/Time: 1/21/2022 2:24 PM  Performed by: Trena Coronado MD  Authorized by: Trena Coronado MD   Universal Protocol:  Procedure performed by:  Consent given by: patient  Patient identity confirmed: verbally with patient        Location:     Location:  [address] [place] [address] location:  Nose  Procedure details: Tools used:  Scissors and tweezers    Wound appearance:  No sign(s) of infection, good wound healing, clean and nontender    Number of sutures removed:  3  Post-procedure details:     Post-removal:  No dressing applied    Patient tolerance of procedure:   Tolerated well, no immediate complications

## 2022-01-21 NOTE — ASSESSMENT & PLAN NOTE
Patient did have a fall  Appears to have been related to poor lighting  Would recommend that he have better lighting when he goes into a room

## 2022-01-21 NOTE — ASSESSMENT & PLAN NOTE
Patient does have facial laceration  Eight sutures were removed today as documented in the physical   This was without incident  Reviewed the documentation from [facility], and it appears that there were 3 on the bridge of the nose, and 6 across the eyebrow, but I could not find the 6th suture in place  Will follow-up as needed  Patient tolerated quite well  Does not appear to have any other problems at this time

## 2022-01-22 DIAGNOSIS — I10 ESSENTIAL HYPERTENSION: ICD-10-CM

## 2022-01-24 RX ORDER — HYDROCHLOROTHIAZIDE 25 MG/1
TABLET ORAL
Qty: 90 TABLET | Refills: 0 | Status: SHIPPED | OUTPATIENT
Start: 2022-01-24 | End: 2022-04-07

## 2022-02-17 ENCOUNTER — APPOINTMENT (OUTPATIENT)
Dept: LAB | Facility: CLINIC | Age: 82
End: 2022-02-17
Payer: MEDICARE

## 2022-02-17 DIAGNOSIS — E78.2 MIXED HYPERLIPIDEMIA: ICD-10-CM

## 2022-02-17 LAB
ALBUMIN SERPL BCP-MCNC: 3.9 G/DL (ref 3.5–5)
ALP SERPL-CCNC: 88 U/L (ref 46–116)
ALT SERPL W P-5'-P-CCNC: 24 U/L (ref 12–78)
ANION GAP SERPL CALCULATED.3IONS-SCNC: 7 MMOL/L (ref 4–13)
AST SERPL W P-5'-P-CCNC: 18 U/L (ref 5–45)
BILIRUB SERPL-MCNC: 0.92 MG/DL (ref 0.2–1)
BUN SERPL-MCNC: 14 MG/DL (ref 5–25)
CALCIUM SERPL-MCNC: 9.1 MG/DL (ref 8.3–10.1)
CHLORIDE SERPL-SCNC: 99 MMOL/L (ref 100–108)
CHOLEST SERPL-MCNC: 211 MG/DL
CO2 SERPL-SCNC: 27 MMOL/L (ref 21–32)
CREAT SERPL-MCNC: 1.08 MG/DL (ref 0.6–1.3)
GFR SERPL CREATININE-BSD FRML MDRD: 63 ML/MIN/1.73SQ M
GLUCOSE P FAST SERPL-MCNC: 93 MG/DL (ref 65–99)
HDLC SERPL-MCNC: 77 MG/DL
LDLC SERPL DIRECT ASSAY-MCNC: 101 MG/DL (ref 0–100)
POTASSIUM SERPL-SCNC: 3.8 MMOL/L (ref 3.5–5.3)
PROT SERPL-MCNC: 7.6 G/DL (ref 6.4–8.2)
SODIUM SERPL-SCNC: 133 MMOL/L (ref 136–145)

## 2022-02-17 PROCEDURE — 83718 ASSAY OF LIPOPROTEIN: CPT

## 2022-02-17 PROCEDURE — 83721 ASSAY OF BLOOD LIPOPROTEIN: CPT

## 2022-02-17 PROCEDURE — 36415 COLL VENOUS BLD VENIPUNCTURE: CPT

## 2022-02-17 PROCEDURE — 82465 ASSAY BLD/SERUM CHOLESTEROL: CPT

## 2022-02-17 PROCEDURE — 80053 COMPREHEN METABOLIC PANEL: CPT

## 2022-02-24 ENCOUNTER — OFFICE VISIT (OUTPATIENT)
Dept: FAMILY MEDICINE CLINIC | Facility: CLINIC | Age: 82
End: 2022-02-24
Payer: MEDICARE

## 2022-02-24 VITALS
SYSTOLIC BLOOD PRESSURE: 162 MMHG | OXYGEN SATURATION: 96 % | DIASTOLIC BLOOD PRESSURE: 78 MMHG | WEIGHT: 143 LBS | HEIGHT: 63 IN | BODY MASS INDEX: 25.34 KG/M2 | HEART RATE: 56 BPM

## 2022-02-24 DIAGNOSIS — E87.1 HYPONATREMIA: ICD-10-CM

## 2022-02-24 DIAGNOSIS — E78.2 MIXED HYPERLIPIDEMIA: ICD-10-CM

## 2022-02-24 DIAGNOSIS — L91.8 SKIN TAG: ICD-10-CM

## 2022-02-24 DIAGNOSIS — Z86.73 HISTORY OF CVA (CEREBROVASCULAR ACCIDENT): ICD-10-CM

## 2022-02-24 DIAGNOSIS — N39.41 URGE INCONTINENCE: ICD-10-CM

## 2022-02-24 DIAGNOSIS — I10 PRIMARY HYPERTENSION: ICD-10-CM

## 2022-02-24 DIAGNOSIS — I70.219 ATHEROSCLEROSIS OF NATIVE ARTERY OF LOWER EXTREMITY WITH INTERMITTENT CLAUDICATION, UNSPECIFIED LATERALITY (HCC): Primary | ICD-10-CM

## 2022-02-24 PROBLEM — S01.81XA FACIAL LACERATION: Status: RESOLVED | Noted: 2022-01-01 | Resolved: 2022-01-01

## 2022-02-24 PROCEDURE — 1123F ACP DISCUSS/DSCN MKR DOCD: CPT | Performed by: FAMILY MEDICINE

## 2022-02-24 PROCEDURE — G0439 PPPS, SUBSEQ VISIT: HCPCS | Performed by: FAMILY MEDICINE

## 2022-02-24 PROCEDURE — 99497 ADVNCD CARE PLAN 30 MIN: CPT | Performed by: FAMILY MEDICINE

## 2022-02-24 PROCEDURE — 99214 OFFICE O/P EST MOD 30 MIN: CPT | Performed by: FAMILY MEDICINE

## 2022-02-24 RX ORDER — TAMSULOSIN HYDROCHLORIDE 0.4 MG/1
0.4 CAPSULE ORAL
Qty: 30 CAPSULE | Refills: 5 | Status: SHIPPED | OUTPATIENT
Start: 2022-02-24

## 2022-02-24 NOTE — ASSESSMENT & PLAN NOTE
Patient does have a skin tag on the left lower lid  He does not have an ophthalmologist   He does go to Dermatology  Would recommend that he talk with them about this  It does appear benign, does not need to be removed unless it is irritated or bothering him in some way

## 2022-02-24 NOTE — ASSESSMENT & PLAN NOTE
Patient does have claudication  Sometimes it bothers him more than others  Again, he is not interested in surgery for this  Reviewed with him about catheterization and stents as a possibility  If he decides to have procedure, I will refer to vascular  In the meantime, the patient should concentrate on risk factor modification, as detailed in carotid discussion

## 2022-02-24 NOTE — ASSESSMENT & PLAN NOTE
Patient is on statins, atorvastatin  Given that his HDL is fantastic, we elected not to make any adjustments  I would still like the LDL a little bit lower, however  This could be adjusted by lowering the amount of cholesterol that he takes in, and restarting exercise which she has not done since his fall previously  He is planning on increasing his physical activity, therefore I will hold off on making any adjustments to cholesterol medications until after that has started

## 2022-02-24 NOTE — PROGRESS NOTES
Assessment and Plan:    Problem List Items Addressed This Visit     Atherosclerosis of native artery of extremity with intermittent claudication ([facility] [address] ) - Primary     Patient does have claudication  Sometimes it bothers him more than others  Again, he is not interested in surgery for this  Reviewed with him about catheterization and stents as a possibility  If he decides to have procedure, I will refer to vascular  In the meantime, the patient should concentrate on risk factor modification, as detailed in carotid discussion  History of CVA (cerebrovascular accident)     Patient did have CVA in the past   This was noted on MRI previously  Reviewed risk factor modification  Hyperlipidemia     Patient is on statins, atorvastatin  Given that his HDL is fantastic, we elected not to make any adjustments  I would still like the LDL a little bit lower, however  This could be adjusted by lowering the amount of cholesterol that he takes in, and restarting exercise which she has not done since his fall previously  He is planning on increasing his physical activity, therefore I will hold off on making any adjustments to cholesterol medications until after that has started  Relevant Orders    Comprehensive metabolic panel    Lipid panel    Hypertension     Currently on amlodipine, atenolol, hydrochlorothiazide  Doing well  Blood pressure today was minimally elevated, but previous has been quite good  No changes  Hyponatremia     Minimal issues with hyponatremia  No change  Skin tag     Patient does have a skin tag on the left lower lid  He does not have an ophthalmologist   He does go to Dermatology  Would recommend that he talk with them about this  It does appear benign, does not need to be removed unless it is irritated or bothering him in some way  Urge incontinence     Likely secondary to BPH  Will trial Flomax  Follow-up in the future    If this helps, continue  If it helps somewhat, we can increase to 0 8 mg   Reviewed other options including alpha blockers, finasteride  Reviewed about prostate cancer risk  Patient did have PSA done previously, in 2016  The symptoms of urgency, and BPH, all predate this PSA  Reviewed with him about rechecking labs, but he stated that he would prefer not to at this point, again as he does not want to have surgery  Relevant Medications    tamsulosin (FLOMAX) 0 4 mg                 Diagnoses and all orders for this visit:    Atherosclerosis of native artery of lower extremity with intermittent claudication, unspecified laterality ([facility] [address] )    History of CVA (cerebrovascular accident)    Mixed hyperlipidemia  -     Comprehensive metabolic panel; Future  -     Lipid panel; Future    Primary hypertension    Hyponatremia    Skin tag    Urge incontinence  -     tamsulosin (FLOMAX) 0 4 mg; Take 1 capsule (0 4 mg total) by mouth daily with dinner              Subjective:      Patient ID: Sharon Barrios is a 78 y o  male  CC:    Chief Complaint   Patient presents with    Follow-up     Patient present today for his 6 month follow up  PT will like to know if Dr Tavia Sherwood can radha a look at the skin tag he has under his left eyelid   Urinary Frequency     for the past 15 yrs    Medicare Wellness Visit     Pt due       HPI:    Patient is here to follow-up on several issues  Hypertension:  Currently on amlodipine, atenolol, hydrochlorothiazide  Blood pressure today was slightly elevated  Hyperlipidemia:  Currently on atorvastatin 10 mg  Hyponatremia:  Sodium reviewed  Has been stable for quite a while  Carotid stenosis:  Patient did have follow-up previously with vascular  He did not wish to have surgery  Reviewed with him about ultrasound study  Urination: He is having problems for the last 15 years  Has urgency, and some urge incontinence  Frequent urination as well  Small amounts    Denies issues with difficulty starting or stopping urination  He is up a few times at night to urinate  No change in volume at night  Patient does have a small lesion on the eyelid  He would prefer to have this office remove it rather than go to Ophthalmology  It is on the lower lid on the left eye  He has been to Ophtho, and not had discussion  He does see Optometrist           The following portions of the patient's history were reviewed and updated as appropriate: allergies, current medications, past family history, past medical history, past social history, past surgical history and problem list       Review of Systems   Constitutional: Negative  HENT: Negative  Eyes: Negative  Respiratory: Negative  Cardiovascular: Negative  Gastrointestinal: Negative  Endocrine: Negative  Genitourinary: Positive for decreased urine volume, frequency and urgency  Musculoskeletal: Negative  Skin: Negative  Skin tag left lower lid   Allergic/Immunologic: Negative  Neurological: Negative  Hematological: Negative  Psychiatric/Behavioral: Negative  Data to review:   Sodium 133, potassium 3 8, calcium 9 1  Blood sugar 93  Creatinine 1 08, GFR:  63  AST 18, ALT 24  Total cholesterol 211, , HDL 77  Objective:    Vitals:    02/24/22 1109   BP: 162/78   BP Location: Right arm   Patient Position: Sitting   Cuff Size: Standard   Pulse: 56   SpO2: 96%   Weight: 64 9 kg (143 lb)   Height: 5' 3" (1 6 m)        Physical Exam  Vitals and nursing note reviewed  Constitutional:       Appearance: Normal appearance  Eyes:     Neck:      Vascular: No carotid bruit  Cardiovascular:      Rate and Rhythm: Normal rate and regular rhythm  Pulses: Normal pulses  Carotid pulses are 2+ on the right side and 2+ on the left side  Heart sounds: Normal heart sounds  No murmur heard  No gallop  Pulmonary:      Effort: Pulmonary effort is normal  No respiratory distress  Breath sounds: Normal breath sounds  No stridor  No wheezing, rhonchi or rales  Chest:      Chest wall: No tenderness  Neurological:      Mental Status: He is alert

## 2022-02-24 NOTE — ASSESSMENT & PLAN NOTE
Patient denies any current chest pains or pressures  Has been doing quite well  Again, reviewed about follow-up with Cardiology in the potential for stress test   Patient will consider  In the meantime, continue with blood pressure and cholesterol control

## 2022-02-24 NOTE — ASSESSMENT & PLAN NOTE
Likely secondary to BPH  Will trial Flomax  Follow-up in the future  If this helps, continue  If it helps somewhat, we can increase to 0 8 mg   Reviewed other options including alpha blockers, finasteride  Reviewed about prostate cancer risk  Patient did have PSA done previously, in 2016  The symptoms of urgency, and BPH, all predate this PSA  Reviewed with him about rechecking labs, but he stated that he would prefer not to at this point, again as he does not want to have surgery

## 2022-02-24 NOTE — PROGRESS NOTES
Assessment and Plan:     Problem List Items Addressed This Visit     None           Preventive health issues were discussed with patient, and age appropriate screening tests were ordered as noted in patient's After Visit Summary  Personalized health advice and appropriate referrals for health education or preventive services given if needed, as noted in patient's After Visit Summary  History of Present Illness:     Patient presents for Medicare Annual Wellness visit    Patient Care Team:  Partha Bernardo MD as PCP - General (Family Medicine)     Problem List:     Patient Active Problem List   Diagnosis    Osteoarthrosis    Hyponatremia    Hypertension    Hyperlipidemia    GERD without esophagitis    Benign familial tremor    Occlusion and stenosis of carotid artery    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)    Bilateral carotid artery disease ([facility] [address] )    Arrhythmia    CVA (cerebral vascular accident) ([facility] [address] )    Stenosis of left vertebral artery    CAD (coronary artery disease)    Actinic keratosis    Insomnia    Facial laceration    Fall      Past Medical and Surgical History:     History reviewed  No pertinent past medical history  Past Surgical History:   Procedure Laterality Date    CORONARY ARTERY BYPASS GRAFT        Family History:     Family History   Problem Relation Age of Onset    Hypertension Mother     Coronary artery disease Son       Social History:     Social History     Socioeconomic History    Marital status:       Spouse name: None    Number of children: None    Years of education: None    Highest education level: None   Occupational History    Occupation: Retired    Tobacco Use    Smoking status: Never Smoker    Smokeless tobacco: Never Used    Tobacco comment: former smoker noted in "allscripts" - no secondhand smoke exposure    Substance and Sexual Activity    Alcohol use: Yes     Comment: social    Drug use: No    Sexual activity: None   Other Topics Concern    None   Social History Narrative    None     Social Determinants of Health     Financial Resource Strain: Not on file   Food Insecurity: Not on file   Transportation Needs: Not on file   Physical Activity: Not on file   Stress: Not on file   Social Connections: Not on file   Intimate Partner Violence: Not on file   Housing Stability: Not on file      Medications and Allergies:     Current Outpatient Medications   Medication Sig Dispense Refill    Acetaminophen (Tylenol) 325 MG CAPS Take by mouth      amLODIPine (NORVASC) 10 mg tablet take 1 tablet by mouth once daily 90 tablet 3    atenolol (TENORMIN) 25 mg tablet take 1/2 tablet by mouth once daily 45 tablet 3    atorvastatin (LIPITOR) 10 mg tablet take 1 tablet by mouth once daily 90 tablet 3    clopidogrel (PLAVIX) 75 mg tablet take 1 tablet by mouth once daily 90 tablet 3    hydrochlorothiazide (HYDRODIURIL) 25 mg tablet take 1/2 tablet by mouth once daily 90 tablet 0    meloxicam (MOBIC) 15 mg tablet take 1 tablet by mouth once daily 30 tablet 3    nitroglycerin (NITROSTAT) 0 4 mg SL tablet Place under the tongue      omeprazole (PriLOSEC) 20 mg delayed release capsule take 1 capsule by mouth once daily 90 capsule 1     No current facility-administered medications for this visit  Allergies   Allergen Reactions    Enalapril Angioedema    Doxycycline       Immunizations:     Immunization History   Administered Date(s) Administered    COVID-19 MODERNA VACC 0 5 ML IM 01/26/2021, 02/23/2021    INFLUENZA 08/26/2014    Influenza Split High Dose Preservative Free IM 08/23/2013, 10/10/2016, 10/03/2017    Influenza, Seasonal Vaccine, Quadrivalent, Adjuvanted,   5e 10/21/2021    Influenza, high dose seasonal 0 7 mL 10/03/2018, 10/17/2019, 12/02/2020    Influenza, seasonal, injectable 1938, 10/21/2012, 10/13/2015    Pneumococcal Conjugate 13-Valent 08/17/2021    Pneumococcal Polysaccharide PPV23 1940    Tdap 01/15/2022    Zoster 1940      Health Maintenance: There are no preventive care reminders to display for this patient  Topic Date Due    COVID-19 Vaccine (3 - Booster for Moderna series) 07/23/2021      Medicare Health Risk Assessment:     /78 (BP Location: Right arm, Patient Position: Sitting, Cuff Size: Standard)   Pulse 56   Ht 5' 3" (1 6 m)   Wt 64 9 kg (143 lb)   SpO2 96%   BMI 25 33 kg/m²      Isabelle Henderson is here for his Subsequent Wellness visit  Health Risk Assessment:   Patient rates overall health as fair  Patient feels that their physical health rating is slightly worse  Patient is satisfied with their life  Eyesight was rated as same  Hearing was rated as same  Patient feels that their emotional and mental health rating is same  Patients states they are never, rarely angry  Patient states they are often unusually tired/fatigued  Pain experienced in the last 7 days has been some  Patient's pain rating has been 5/10  Patient states that he has experienced no weight loss or gain in last 6 months  All over     Depression Screening:   PHQ-2 Score: 0      Fall Risk Screening: In the past year, patient has experienced: history of falling in past year    Number of falls: 2 or more  Injured during fall?: Yes    Feels unsteady when standing or walking?: Yes    Worried about falling?: Yes      Home Safety:  Patient does not have trouble with stairs inside or outside of their home  Patient has working smoke alarms and has working carbon monoxide detector  Home safety hazards include: none  Nutrition:   Current diet is Regular  Medications:   Patient is currently taking over-the-counter supplements  OTC medications include: see medication list  Patient is able to manage medications       Activities of Daily Living (ADLs)/Instrumental Activities of Daily Living (IADLs):   Walk and transfer into and out of bed and chair?: Yes  Dress and groom yourself?: Yes Bathe or shower yourself?: Yes    Feed yourself? Yes  Do your laundry/housekeeping?: Yes  Manage your money, pay your bills and track your expenses?: Yes  Make your own meals?: Yes    Do your own shopping?: Yes    Previous Hospitalizations:   Any hospitalizations or ED visits within the last 12 months?: Yes    How many hospitalizations have you had in the last year?: 1-2    Hospitalization Comments: Due to a fall     Advance Care Planning:   Living will: Yes    Durable POA for healthcare: Yes    Advanced directive: Yes    Advanced directive counseling given: Yes    Five wishes given: No      Cognitive Screening:   Provider or family/friend/caregiver concerned regarding cognition?: No    PREVENTIVE SCREENINGS      Cardiovascular Screening:    General: Screening Not Indicated and History Lipid Disorder      Diabetes Screening:     General: Screening Current      Colorectal Cancer Screening:     General: Screening Not Indicated      Prostate Cancer Screening:    General: Screening Not Indicated      Osteoporosis Screening:    General: Screening Not Indicated      Abdominal Aortic Aneurysm (AAA) Screening:        General: Screening Not Indicated      Lung Cancer Screening:     General: Screening Not Indicated      Hepatitis C Screening:    General: Screening Not Indicated    Screening, Brief Intervention, and Referral to Treatment (SBIRT)    Screening      AUDIT-C Screenin) How often did you have a drink containing alcohol in the past year? 4 or more times a week  2) How many drinks did you have on a typical day when you were drinking in the past year? 3 to 4  3) How often did you have 6 or more drinks on one occasion in the past year? never    AUDIT-C Score: 4  Interpretation: Score 4-12 (male): POSITIVE screen for alcohol misuse    AUDIT Screenin) How often during the last year have you found that you were not able to stop drinking once you had started?  0 - never  5) How often during the last year have you failed to do what was normally expected from you because of drinking? 0 - never  6) How often during the last year have you needed a first drink in the morning to get yourself going after a heavy drinking session?  0 - never  7) How often during the last year have you had a feeling of guilt or remorse after drinking? 0 - never  8) How often during the last year have you been unable to remember what happened the night before because you had been drinking? 0 - never  9) Have you or someone else been injured as a result of your drinking? 0 - no  10) Has a relative or friend or a doctor or another health worker been concerned about your drinking or suggested you cut down? 0 - no    AUDIT Score: 4  Interpretation: Low risk alcohol consumption    Single Item Drug Screening:  How often have you used an illegal drug (including marijuana) or a prescription medication for non-medical reasons in the past year? never    Single Item Drug Screen Score: 0  Interpretation: Negative screen for possible drug use disorder      Alfred De Paz MD

## 2022-02-24 NOTE — ACP (ADVANCE CARE PLANNING)
Serious Illness Conversation    1  What is your understanding now of where you are with your illness? Prognostic Understanding: appropriate understanding of prognosis     2  How much information about what is likely to be ahead with your illness would you like to have? Information: patient wants to be fully informed     3  What did you (clinician) communicate to the patient? Prognostic Communication: Uncertain - It can be difficult to predict what will happen with your illness  I hope you will continue to live well for a long time but Im worried that you could get sick quickly, and I think it is important to prepare for that possibility  4  If your health situation worsens, what are your most important goals? Goals: be physically comfortable, be mentally aware, be independent     5  What are the biggest fears and worries about the future and your health? Fears/Worries: being dependent     6  What abilities are so critical to your life that you cannot imagine living without them? Unacceptable Function: not being able to care for myself, including toileting and feeding  [place]     7  What gives you strength as you think about the future with your illness? 8  If you become sicker, how much are you willing to go through for the possibility of gaining more time? Be in the hospital: No Have a feeding tube: No   Be in the ICU: No Live in a nursing home: No   Be on a ventilator: No Be uncomfortable: No   Be on dialysis: No Undergo aggressive test and/or procedures: No      9  How much does your proxy and family know about your priorities and wishes? Discussion Discussion: extensive discussion with family about goals and wishes     Citlaly Champagne heard you say that [place] is really important to you  Keeping that in mind, and what we know about your illness, I recommend that we Continue current treatments  This will help us make sure that your treatment plans reflect whats important to you       How does this plan sound to you? I will do everything I can to help you through this    Patient verbalized understanding of the plan     I have spent 20minutes speaking with my patient on advanced care planning today or during this visit     Advanced directives  Five Wishes: Patient has Five Wishes, not in chart

## 2022-02-24 NOTE — ASSESSMENT & PLAN NOTE
Patient did have CVA in the past   This was noted on MRI previously  Reviewed risk factor modification

## 2022-02-24 NOTE — ASSESSMENT & PLAN NOTE
Currently on amlodipine, atenolol, hydrochlorothiazide  Doing well  Blood pressure today was minimally elevated, but previous has been quite good  No changes

## 2022-02-24 NOTE — ASSESSMENT & PLAN NOTE
Reviewed with patient about ultrasound and further follow-up for carotid artery disease  Patient is not interested in surgery at all  Understands risks behind this  Because of that, we elected not to order the ultrasound as it would quantify something that would then suggest surgery which he does not want  In the meantime, he should continue with risk factor modification which would mean keeping blood pressure, sugar, cholesterol under control

## 2022-02-27 DIAGNOSIS — M19.90 OSTEOARTHRITIS, UNSPECIFIED OSTEOARTHRITIS TYPE, UNSPECIFIED SITE: ICD-10-CM

## 2022-03-02 RX ORDER — MELOXICAM 15 MG/1
TABLET ORAL
Qty: 30 TABLET | Refills: 3 | Status: SHIPPED | OUTPATIENT
Start: 2022-03-02 | End: 2022-04-07

## 2022-03-15 DIAGNOSIS — I10 ESSENTIAL HYPERTENSION: ICD-10-CM

## 2022-03-15 RX ORDER — ATENOLOL 25 MG/1
TABLET ORAL
Qty: 45 TABLET | Refills: 3 | Status: SHIPPED | OUTPATIENT
Start: 2022-03-15

## 2022-04-07 ENCOUNTER — OFFICE VISIT (OUTPATIENT)
Dept: FAMILY MEDICINE CLINIC | Facility: CLINIC | Age: 82
End: 2022-04-07
Payer: MEDICARE

## 2022-04-07 VITALS
HEIGHT: 63 IN | BODY MASS INDEX: 25.73 KG/M2 | SYSTOLIC BLOOD PRESSURE: 142 MMHG | WEIGHT: 145.2 LBS | DIASTOLIC BLOOD PRESSURE: 62 MMHG | HEART RATE: 53 BPM | OXYGEN SATURATION: 99 %

## 2022-04-07 DIAGNOSIS — K21.9 GERD WITHOUT ESOPHAGITIS: ICD-10-CM

## 2022-04-07 DIAGNOSIS — B35.1 ONYCHOMYCOSIS: ICD-10-CM

## 2022-04-07 DIAGNOSIS — E78.2 MIXED HYPERLIPIDEMIA: ICD-10-CM

## 2022-04-07 DIAGNOSIS — I10 PRIMARY HYPERTENSION: ICD-10-CM

## 2022-04-07 DIAGNOSIS — N39.41 URGE INCONTINENCE: ICD-10-CM

## 2022-04-07 DIAGNOSIS — I63.9 THALAMIC STROKE (HCC): Primary | ICD-10-CM

## 2022-04-07 DIAGNOSIS — I48.92 ATRIAL FLUTTER, UNSPECIFIED TYPE (HCC): ICD-10-CM

## 2022-04-07 DIAGNOSIS — I25.810 CORONARY ARTERY DISEASE INVOLVING CORONARY BYPASS GRAFT OF NATIVE HEART WITHOUT ANGINA PECTORIS: ICD-10-CM

## 2022-04-07 PROBLEM — I63.81 THALAMIC STROKE (HCC): Status: ACTIVE | Noted: 2022-01-01

## 2022-04-07 PROCEDURE — 99215 OFFICE O/P EST HI 40 MIN: CPT | Performed by: FAMILY MEDICINE

## 2022-04-07 RX ORDER — FAMOTIDINE 20 MG
TABLET ORAL
COMMUNITY

## 2022-04-07 RX ORDER — ASPIRIN 81 MG/1
81 TABLET ORAL DAILY
COMMUNITY

## 2022-04-07 RX ORDER — ATORVASTATIN CALCIUM 40 MG/1
40 TABLET, FILM COATED ORAL DAILY
Start: 2022-04-07

## 2022-04-07 RX ORDER — ATORVASTATIN CALCIUM 40 MG/1
40 TABLET, FILM COATED ORAL DAILY
COMMUNITY
End: 2022-04-07

## 2022-04-07 NOTE — ASSESSMENT & PLAN NOTE
Patient does have omeprazole, but has not been taking it every day  At this point, would recommend that he take it every day as there is likely some aspect of reflux causing the stridor symptoms that he was having when he eats  This is likely secondary to the hiatal hernia as well  That was noted on a CT scan

## 2022-04-07 NOTE — ASSESSMENT & PLAN NOTE
Currently on Flomax  Seems to have improved urinary symptoms of bit, but he still is having some issues  Question if this is related to CVA versus just BPH symptoms  Continue on Flomax for now

## 2022-04-07 NOTE — PROGRESS NOTES
Assessment and Plan:    Problem List Items Addressed This Visit     Atrial flutter, unspecified type [facility])     Per hospital  No findings on ekg there  Will need to follow with Cardio after the Zio patch  CAD (coronary artery disease)     Patient does have history of heart disease  Patient will follow with Cardiology  He has that set up for May  No change at the moment  Again, the monitor is currently on  Heart rate today was on the low end of normal            GERD without esophagitis     Patient does have omeprazole, but has not been taking it every day  At this point, would recommend that he take it every day as there is likely some aspect of reflux causing the stridor symptoms that he was having when he eats  This is likely secondary to the hiatal hernia as well  That was noted on a CT scan  Hyperlipidemia     Atorvastatin was increased to 40 mg  Will continue to monitor  No change  Relevant Medications    atorvastatin (LIPITOR) 40 mg tablet    Hypertension     Blood pressure today is slightly up, but at goal for what I would want  His heart rate slightly low  Would not make any adjustments  Thalamic stroke [facility]) - Primary     Patient did have CVA based on evaluation at [facility]  He does have Neurology evaluation coming up  At this point, would not make any adjustments, but he certainly does have to have safety issues addressed  Family is staying with him for now  They are going to look to see if there is only improvement later  Continuing PT, OT, speech  As far as cause for stroke, a ZIO patch is pending at this point  He does have that on the, and will further evaluation with Cardiology later on  Continuing on Plavix and aspirin for 1 month  Consider moving to Plavix only after the 1 month  Urge incontinence     Currently on Flomax  Seems to have improved urinary symptoms of bit, but he still is having some issues    Question if this is related to CVA versus just BPH symptoms  Continue on Flomax for now  Other Visit Diagnoses     Onychomycosis        Patient and family both discussed about significant at thickened brittle nails  Consider Podiatry for home verses office  Relevant Orders    Ambulatory Referral to Podiatry                 Diagnoses and all orders for this visit:    [facility] ([facility] )    GERD without esophagitis    Primary hypertension    Mixed hyperlipidemia  -     atorvastatin (LIPITOR) 40 mg tablet; Take 1 tablet (40 mg total) by mouth daily    Coronary artery disease involving coronary bypass graft of native heart without angina pectoris    Urge incontinence    Atrial flutter, unspecified type ([facility] )    Onychomycosis  Comments:  Patient and family both discussed about significant at thickened brittle nails  Consider Podiatry for home verses office  Orders:  -     Ambulatory Referral to Podiatry; Future    Other orders  -     aspirin (ECOTRIN LOW STRENGTH) 81 mg EC tablet; Take 81 mg by mouth daily  -     Vitamin D, Cholecalciferol, 25 MCG (1000 UT) CAPS; Take by mouth  -     Discontinue: atorvastatin (LIPITOR) 40 mg tablet; Take 40 mg by mouth daily              Subjective:      Patient ID: Deann Davis is a 78 y o  male  CC:    Chief Complaint   Patient presents with    Follow-up     Pt here to Follow up from Hospital stay  Pt was told in the Hospital that he had Heart issues (a-fib) and has had a Thalamic stroke  Pt states he is not having symptoms anymore but his daughter doyle rubio disagrees  kw       HPI:    Patient is here to follow-up on multiple issues  He was recently admitted to hospital for CVA  He was in his usual state of health at home, many developed significant acute confusion  With that, he went to the hospital, had CT showing CVA    While he was in hospital, they recommended that he have a ZIO patch as an outpatient, and then Cardiology would review to determine if he did have any arrhythmia that would be causing some of problems  That ZIO patch was placed on the day of discharge, and should still be present today  After speaking with his daughters, the patient did have AFib in urgent care, as well as in the emergency room initially based on their recall of the visits  Again, this in the emergency room they did do an EKG, but it only showed PVCs on the monitor  They were not able to catch AFib on the EKG  In hospital, he did also have echo, MRI ice  These were reviewed  The MRI did show thalamic stroke  This was consistent with the CT scan that he had initially  There is also an artery occlusion noted  This appeared to be chronic  There is also 1 other area that was noted on the MRI  Again, the patient should be following with Neurology in a bit  Patient was to take dual anti-platelet therapy for 1 month, and then switch to just Plavix  Patient is currently in PT  Daughter thought he wwas better with steps, and is more active  Home modification being done  Cognition is off with this  Medications were changed in hospital     Has had some wheezes  Daughter mentioned about stridor with this  He has hiatal hernia, and then notes wheezing after eating  Quite pronounced at times  He has had some issues with depression and anxiety from daughter  There was a question about zoloft  Patient still has some issues with regard to urinary symptoms  Flomax as certainly helped, but he still occasionally will have urgency as well as incontinence  The following portions of the patient's history were reviewed and updated as appropriate: allergies, current medications, past family history, past medical history, past social history, past surgical history and problem list       Review of Systems   Constitutional: Negative  HENT: Negative  Eyes: Negative  Respiratory: Negative  Cardiovascular: Negative  Gastrointestinal:        Per HPI   Genitourinary: Negative  Skin: Negative  Allergic/Immunologic: Negative  Neurological: Positive for speech difficulty and weakness  Psychiatric/Behavioral: Positive for confusion and decreased concentration  Negative for behavioral problems, self-injury and suicidal ideas  The patient is not nervous/anxious  Data to review:       Objective:    Vitals:    22 1045   BP: 142/62   BP Location: Left arm   Patient Position: Sitting   Pulse: (!) 53   SpO2: 99%   Weight: 65 9 kg (145 lb 3 2 oz)   Height: 5' 3" (1 6 m)        Physical Exam  Vitals and nursing note reviewed  Constitutional:       Appearance: Normal appearance  Neck:      Vascular: No carotid bruit  Cardiovascular:      Rate and Rhythm: Normal rate and regular rhythm  Pulses: Normal pulses  Carotid pulses are 2+ on the right side and 2+ on the left side  Heart sounds: Normal heart sounds  No murmur heard  No gallop  Pulmonary:      Effort: Pulmonary effort is normal  No respiratory distress  Breath sounds: Normal breath sounds  No stridor  No wheezing, rhonchi or rales  Chest:      Chest wall: No tenderness  Musculoskeletal:      Right lower le+ Pitting Edema present  Left lower le+ Pitting Edema present  Neurological:      Mental Status: He is alert  Cranial Nerves: Cranial nerves are intact  Sensory: Sensation is intact  Motor: Weakness (Left upper extremity) present  Falls Plan of Care: balance, strength, and gait training instructions were provided and referral to physical therapy  I have spent 55 minutes with Patient and family today in which greater than 50% of this time was spent in counseling/coordination of care regarding Diagnostic results, Prognosis, Risks and benefits of tx options, Intructions for management, Importance of tx compliance, Risk factor reductions and Impressions

## 2022-04-07 NOTE — PATIENT INSTRUCTIONS
Problem List Items Addressed This Visit     Atrial flutter, unspecified type [facility])     Per hospital  No findings on ekg there  Will need to follow with Cardio after the Zio patch  CAD (coronary artery disease)     Patient does have history of heart disease  Patient will follow with Cardiology  He has that set up for May  No change at the moment  Again, the monitor is currently on  Heart rate today was on the low end of normal            GERD without esophagitis     Patient does have omeprazole, but has not been taking it every day  At this point, would recommend that he take it every day as there is likely some aspect of reflux causing the stridor symptoms that he was having when he eats  This is likely secondary to the hiatal hernia as well  That was noted on a CT scan  Hyperlipidemia     Atorvastatin was increased to 40 mg  Will continue to monitor  No change  Relevant Medications    atorvastatin (LIPITOR) 40 mg tablet    Hypertension     Blood pressure today is slightly up, but at goal for what I would want  His heart rate slightly low  Would not make any adjustments  Thalamic stroke [facility]) - Primary     Patient did have CVA based on evaluation at [facility]  He does have Neurology evaluation coming up  At this point, would not make any adjustments, but he certainly does have to have safety issues addressed  Family is staying with him for now  They are going to look to see if there is only improvement later  Continuing PT, OT, speech  As far as cause for stroke, a ZIO patch is pending at this point  He does have that on the, and will further evaluation with Cardiology later on  Continuing on Plavix and aspirin for 1 month  Consider moving to Plavix only after the 1 month  Urge incontinence     Currently on Flomax  Seems to have improved urinary symptoms of bit, but he still is having some issues    Question if this is related to CVA versus just BPH symptoms  Continue on Flomax for now  Other Visit Diagnoses     Onychomycosis        Patient and family both discussed about significant at thickened brittle nails  Consider Podiatry for home verses office  Relevant Orders    Ambulatory Referral to Podiatry          COVID 19 Instructions    Mane Martinez was advised to limit contact with others to essential tasks such as getting food, medications, and medical care  Proper handwashing reviewed, and Hand sanitzer when washing is not available  If the patient develops symptoms of COVID 19, the patient should call the office as soon as possible  For 2408-1849 Flu season, it is strongly recommended that Flu Vaccinations be obtained  Virtual Visits:  Gurpreet: This works on smart phones (any phone with Internet browsing capability)  You should get a text message when the provider is ready to see you  Click on the link in the text message, and the call should start  You will need to type in your name, and allow camera and microphone access  This is HIPPA compliant, and secure  If you have not already done so, get immunized to COVID 19  We are committed to getting you vaccinated as soon as possible and will be closely following CDC and [facility]  guidelines as they are released and revised  Please refer to our COVID-19 vaccine webpage for the most up to date information on the vaccine and our distribution efforts  This site will also have the most up to date recommendations for COVID booster vaccine  Db watkins    Call [phone] ([phone]), option 7    OUR NEW LOCATION:    [address], [facility] [address], [place], [address]  Fax: [phone]    Lab services and OB/GYN are at this location as well

## 2022-04-07 NOTE — ASSESSMENT & PLAN NOTE
Patient did have CVA based on evaluation at [facility]  He does have Neurology evaluation coming up  At this point, would not make any adjustments, but he certainly does have to have safety issues addressed  Family is staying with him for now  They are going to look to see if there is only improvement later  Continuing PT, OT, speech  As far as cause for stroke, a ZIO patch is pending at this point  He does have that on the, and will further evaluation with Cardiology later on  Continuing on Plavix and aspirin for 1 month  Consider moving to Plavix only after the 1 month

## 2022-04-07 NOTE — ASSESSMENT & PLAN NOTE
Blood pressure today is slightly up, but at goal for what I would want  His heart rate slightly low  Would not make any adjustments

## 2022-04-07 NOTE — ASSESSMENT & PLAN NOTE
Patient does have history of heart disease  Patient will follow with Cardiology  He has that set up for May  No change at the moment  Again, the monitor is currently on    Heart rate today was on the low end of normal

## 2022-05-13 DIAGNOSIS — I10 ESSENTIAL HYPERTENSION: ICD-10-CM

## 2022-05-16 RX ORDER — AMLODIPINE BESYLATE 10 MG/1
TABLET ORAL
Qty: 90 TABLET | Refills: 3 | Status: SHIPPED | OUTPATIENT
Start: 2022-05-16

## 2022-06-27 ENCOUNTER — TELEPHONE (OUTPATIENT)
Dept: FAMILY MEDICINE CLINIC | Facility: CLINIC | Age: 82
End: 2022-06-27

## 2022-06-27 NOTE — TELEPHONE ENCOUNTER
A message was left on referral from Bridgette Figueroa who is a home nurse from [facility] stating that when she saw pt, he was complaining a Left great toe pain and the outside is red  She wanted to know if Dr Jacklyn Reece can call something in   She can be reached at [phone]

## 2022-06-27 NOTE — TELEPHONE ENCOUNTER
I called and lmom for pt that a OV is needed   I asked he call back to try to set this up in office or Virtual but a VIDEO is a must

## 2022-07-06 ENCOUNTER — TELEPHONE (OUTPATIENT)
Dept: FAMILY MEDICINE CLINIC | Facility: CLINIC | Age: 82
End: 2022-07-06

## 2022-07-06 NOTE — TELEPHONE ENCOUNTER
As stated at prior notes, the patient has had several admissions to the hospital on a significant change in his condition  I would like to see him in person, and then evaluate      If that is not possible, a video visit can be done so we can examine the area virtually

## 2022-07-06 NOTE — TELEPHONE ENCOUNTER
job from [facility] [address] called in today to inform dr Julissa Barboza of the patients condition and see if anything can be done  job stated that patient is having R toes pain in his big toe  Patient was recently seen at [place] [address] emergency room and they believed that it could be gout  job stated that patient is residing at [facility]  job stated that patient is taking tylenol  for the pain and that he is at a 6 out of 10 on a pain scale  I did advise job that patient may need a visit as he hasn't been seem for this issue before, job can be reach at [phone], she stated that if she doesn't answer a voicemail can be left   Please advise   Thank you

## 2022-07-08 NOTE — TELEPHONE ENCOUNTER
Left detailed message on Jose Antonio Falcon mailbox advising her with the below mentioned recommendations from [facility]  Christiano Huber was advised to call back to schedule patient

## 2022-08-08 ENCOUNTER — TELEPHONE (OUTPATIENT)
Dept: FAMILY MEDICINE CLINIC | Facility: CLINIC | Age: 82
End: 2022-08-08

## 2022-08-08 NOTE — TELEPHONE ENCOUNTER
Vivi from [facility] is calling to follow up on forms that were faxed over on 7/22 and on 7/25  They are in Dr Shepherd Music folder  Per Nirali Euceda, they are time sensitive  Is another provider willing to sign off? If there are any questions please call Vivi at [phone]  Thank you